# Patient Record
Sex: FEMALE | Race: WHITE | NOT HISPANIC OR LATINO | Employment: FULL TIME | URBAN - METROPOLITAN AREA
[De-identification: names, ages, dates, MRNs, and addresses within clinical notes are randomized per-mention and may not be internally consistent; named-entity substitution may affect disease eponyms.]

---

## 2017-02-17 ENCOUNTER — OFFICE VISIT (OUTPATIENT)
Dept: FAMILY MEDICINE | Facility: CLINIC | Age: 57
End: 2017-02-17
Payer: COMMERCIAL

## 2017-02-17 ENCOUNTER — HOSPITAL ENCOUNTER (OUTPATIENT)
Dept: MAMMOGRAPHY | Facility: CLINIC | Age: 57
Discharge: HOME | End: 2017-02-17
Attending: FAMILY MEDICINE | Admitting: FAMILY MEDICINE
Payer: COMMERCIAL

## 2017-02-17 VITALS
SYSTOLIC BLOOD PRESSURE: 112 MMHG | RESPIRATION RATE: 18 BRPM | HEART RATE: 104 BPM | OXYGEN SATURATION: 98 % | BODY MASS INDEX: 24.43 KG/M2 | TEMPERATURE: 98 F | WEIGHT: 152 LBS | DIASTOLIC BLOOD PRESSURE: 64 MMHG | HEIGHT: 66 IN

## 2017-02-17 DIAGNOSIS — Z11.59 NEED FOR HEPATITIS C SCREENING TEST: ICD-10-CM

## 2017-02-17 DIAGNOSIS — Z12.11 SPECIAL SCREENING FOR MALIGNANT NEOPLASMS, COLON: ICD-10-CM

## 2017-02-17 DIAGNOSIS — Z12.31 VISIT FOR SCREENING MAMMOGRAM: ICD-10-CM

## 2017-02-17 DIAGNOSIS — Z00.00 ENCOUNTER FOR ROUTINE ADULT HEALTH EXAMINATION WITHOUT ABNORMAL FINDINGS: Primary | ICD-10-CM

## 2017-02-17 LAB
CHOLEST SERPL-MCNC: 259 MG/DL
GLUCOSE SERPL-MCNC: 92 MG/DL (ref 70–99)
HCV AB SERPL QL IA: NORMAL
HDLC SERPL-MCNC: 65 MG/DL
LDLC SERPL CALC-MCNC: 151 MG/DL
NONHDLC SERPL-MCNC: 194 MG/DL
TRIGL SERPL-MCNC: 213 MG/DL

## 2017-02-17 PROCEDURE — 80061 LIPID PANEL: CPT | Performed by: FAMILY MEDICINE

## 2017-02-17 PROCEDURE — 82947 ASSAY GLUCOSE BLOOD QUANT: CPT | Performed by: FAMILY MEDICINE

## 2017-02-17 PROCEDURE — 36415 COLL VENOUS BLD VENIPUNCTURE: CPT | Performed by: FAMILY MEDICINE

## 2017-02-17 PROCEDURE — 99396 PREV VISIT EST AGE 40-64: CPT | Performed by: FAMILY MEDICINE

## 2017-02-17 PROCEDURE — 86803 HEPATITIS C AB TEST: CPT | Performed by: FAMILY MEDICINE

## 2017-02-17 PROCEDURE — G0202 SCR MAMMO BI INCL CAD: HCPCS

## 2017-02-17 ASSESSMENT — PAIN SCALES - GENERAL: PAINLEVEL: NO PAIN (0)

## 2017-02-17 NOTE — NURSING NOTE
"Chief Complaint   Patient presents with     Physical       Initial /64  Pulse 104  Temp 98  F (36.7  C)  Resp 18  Ht 5' 5.7\" (1.669 m)  Wt 152 lb (68.9 kg)  LMP 12/04/2016  SpO2 98%  Breastfeeding? No  BMI 24.76 kg/m2 Estimated body mass index is 24.76 kg/(m^2) as calculated from the following:    Height as of this encounter: 5' 5.7\" (1.669 m).    Weight as of this encounter: 152 lb (68.9 kg).  Medication Reconciliation: complete    "

## 2017-02-17 NOTE — MR AVS SNAPSHOT
After Visit Summary   2/17/2017    Diann Altman    MRN: [id number]           Patient Information     Date Of Birth          1960        Visit Information        Provider Department      2/17/2017 7:45 AM Adonay Robison MD [facility]        Today's Diagnoses     Encounter for routine adult health examination without abnormal findings    -  1    Need for hepatitis C screening test        Special screening for malignant neoplasms, colon          Care Instructions      Preventive Health Recommendations  Female Ages 50 - 64    Yearly exam: See your health care provider every year in order to  o Review health changes.   o Discuss preventive care.    o Review your medicines if your doctor has prescribed any.      Get a Pap test every three years (unless you have an abnormal result and your provider advises testing more often).    If you get Pap tests with HPV test, you only need to test every 5 years, unless you have an abnormal result.     You do not need a Pap test if your uterus was removed (hysterectomy) and you have not had cancer.    You should be tested each year for STDs (sexually transmitted diseases) if you're at risk.     Have a mammogram every 1 to 2 years.    Have a colonoscopy at age 50, or have a yearly FIT test (stool test). These exams screen for colon cancer.      Have a cholesterol test every 5 years, or more often if advised.    Have a diabetes test (fasting glucose) every three years. If you are at risk for diabetes, you should have this test more often.     If you are at risk for osteoporosis (brittle bone disease), think about having a bone density scan (DEXA).    Shots: Get a flu shot each year. Get a tetanus shot every 10 years.    Nutrition:     Eat at least 5 servings of fruits and vegetables each day.    Eat whole-grain bread, whole-wheat pasta and brown rice instead of white grains and rice.    Talk to your provider about Calcium and Vitamin D.  "    Lifestyle    Exercise at least 150 minutes a week (30 minutes a day, 5 days a week). This will help you control your weight and prevent disease.    Limit alcohol to one drink per day.    No smoking.     Wear sunscreen to prevent skin cancer.     See your dentist every six months for an exam and cleaning.    See your eye doctor every 1 to 2 years.          Follow-ups after your visit        Who to contact     If you have questions or need follow up information about today's clinic visit or your schedule please contact [facility] directly at [phone].  Normal or non-critical lab and imaging results will be communicated to you by [facility], letter or phone within 4 business days after the clinic has received the results. If you do not hear from us within 7 days, please contact the clinic through [employer] or phone. If you have a critical or abnormal lab result, we will notify you by phone as soon as possible.  Submit refill requests through [employer] or call your pharmacy and they will forward the refill request to us. Please allow 3 business days for your refill to be completed.          Additional Information About Your Visit        [facility] Information     [employer] gives you secure access to your electronic health record. If you see a primary care provider, you can also send messages to your care team and make appointments. If you have questions, please call your primary care clinic.  If you do not have a primary care provider, please call [phone] and they will assist you.        Care EveryWhere ID     This is your Care EveryWhere ID. This could be used by other organizations to access your [facility] medical records  [id number]        Your Vitals Were     Pulse Temperature Respirations Height Last Period Pulse Oximetry    104 98  F (36.7  C) 18 5' 5.7\" (1.669 m) 12/04/2016 98%    Breastfeeding? BMI (Body Mass Index)                No 24.76 kg/m2           Blood Pressure from Last 3 Encounters: "   02/17/17 112/64   09/08/16 122/66   02/19/16 100/68    Weight from Last 3 Encounters:   02/17/17 152 lb (68.9 kg)   09/08/16 147 lb (66.7 kg)   02/19/16 142 lb (64.4 kg)              We Performed the Following     Glucose     Hepatitis C Screen Reflex to HCV RNA Quant and Genotype     Lipid panel reflex to direct LDL        Primary Care Provider Office Phone # Fax #    Adonay Robison [phone]       [facility] [place] [address]   [place] [address]        Thank you!     Thank you for choosing [facility]  for your care. Our goal is always to provide you with excellent care. Hearing back from our patients is one way we can continue to improve our services. Please take a few minutes to complete the written survey that you may receive in the mail after your visit with us. Thank you!             Your Updated Medication List - Protect others around you: Learn how to safely use, store and throw away your medicines at www.disposemymeds.org.      Notice  As of 2/17/2017 11:59 PM    You have not been prescribed any medications.

## 2017-02-17 NOTE — PROGRESS NOTES
SUBJECTIVE:     CC: Diann Altman is an 56 year old woman who presents for preventive health visit.     Healthy Habits:    Do you get at least three servings of calcium containing foods daily (dairy, green leafy vegetables, etc.)? yes    Amount of exercise or daily activities, outside of work: 6 days per week.     Problems taking medications regularly No    Medication side effects: No    Have you had an eye exam in the past two years? Yes, wears glasses, goes in April     Do you see a dentist twice per year? yes  Do you have sleep apnea, excessive snoring or daytime drowsiness?no        Today's PHQ-2 Score: 0  PHQ-2 ( 1999 Pf[employer]) 2/14/2017 2/15/2016   Little interest or pleasure in doing things Not at all Not at all   Feeling down, depressed or hopeless Not at all Not at all   PHQ-2 Score 0 0       Abuse: Current or Past(Physical, Sexual or Emotional)- No  Do you feel safe in your environment - Yes    Social History   Substance Use Topics     Smoking status: Former Smoker     Quit date: 1/1/1987     Smokeless tobacco: Never Used     Alcohol use Yes      Comment: 1-2 uses/year     The patient does not drink >3 drinks per day nor >7 drinks per week.    Recent Labs   Lab Test  02/12/16   0916  01/14/15   0909   12/21/12   0934   CHOL  237*  217*   < >  226*   HDL  82  68   < >  76   LDL  133*  117   < >  126   TRIG  111  158*   < >  121   CHOLHDLRATIO   --   3.2   --   3.0   NHDL  155*   --    --    --     < > = values in this interval not displayed.       Reviewed orders with patient.  Reviewed health maintenance and updated orders accordingly - Yes    Mammo Decision Support:  Today   Patient over age 50, mutual decision to screen reflected in health maintenance.    Pertinent mammograms are reviewed under the imaging tab.  History of abnormal Pap smear: NO - age 30- 65 PAP every 3 years recommended  All Histories reviewed and updated in Epic.    Past Medical History   Diagnosis Date     NO ACTIVE PROBLEMS        Past Surgical History   Procedure Laterality Date     No history of surgery       Eye surgery  as child     bilat.     Obstetric History       T2      TAB0   SAB0   E0   M0   L2       # Outcome Date GA Lbr Luis Carlos/2nd Weight Sex Delivery Anes PTL Lv   2 Term            1 Term                   ROS:  C: NEGATIVE for fever, chills, change in weight  I: NEGATIVE for worrisome rashes, moles or lesions  E: NEGATIVE for vision changes or irritation  ENT: NEGATIVE for ear, mouth and throat problems  R: NEGATIVE for significant cough or SOB  B: NEGATIVE for masses, tenderness or discharge  CV: NEGATIVE for chest pain, palpitations or peripheral edema  GI: NEGATIVE for nausea, abdominal pain, heartburn, or change in bowel habits  : NEGATIVE for unusual urinary or vaginal symptoms. No vaginal bleeding.  M: NEGATIVE for significant arthralgias or myalgia  N: NEGATIVE for weakness, dizziness or paresthesias  P: NEGATIVE for changes in mood or affect     Problem list, Medication list, Allergies, and Medical/Social/Surgical histories reviewed in [facility] and updated as appropriate.  Labs reviewed in EPIC  BP Readings from Last 3 Encounters:   17 112/64   16 122/66   16 100/68    Wt Readings from Last 3 Encounters:   17 152 lb (68.9 kg)   16 147 lb (66.7 kg)   16 142 lb (64.4 kg)                  Patient Active Problem List   Diagnosis     HYPERLIPIDEMIA LDL GOAL <130     Counseling regarding advanced directives     Past Surgical History   Procedure Laterality Date     No history of surgery       Eye surgery  as child     bilat.       Social History   Substance Use Topics     Smoking status: Former Smoker     Quit date: 1987     Smokeless tobacco: Never Used     Alcohol use Yes      Comment: 1-2 uses/year     Family History   Problem Relation Age of Onset     HEART DISEASE Father      Lipids Father      DIABETES Maternal Grandmother      Eye Disorder Maternal Grandmother       "cataract     HEART DISEASE Maternal Grandmother      Arthritis Maternal Grandmother      Hypertension Maternal Grandmother      Hypertension Sister      Lipids Sister      Gynecology Sister      pelvic inflam disease     Allergies Mother      Respiratory Mother      emphysema     HEART DISEASE Mother      COPD     Arthritis Paternal Grandmother      Eye Disorder Paternal Grandmother      cataract     GASTROINTESTINAL DISEASE Paternal Grandmother      diverticulitis     Lipids Paternal Grandfather      DIABETES Paternal Grandfather      HEART DISEASE Maternal Grandfather      Cardiovascular Maternal Grandfather      MI         No current outpatient prescriptions on file.     Allergies   Allergen Reactions     Clindamycin Hives     Recent Labs   Lab Test  02/17/17   0902  02/12/16   0916  01/14/15   0909 01/09/14 12/21/12   0934   07/28/10   0940   LDL  151*  133*  117  69  126   < >   --    HDL  65  82  68  64  76   < >   --    TRIG  213*  111  158*  135  121   < >   --    ALT   --   21   --   12  25   < >   --    CR   --   0.70   --   0.72  0.66   --    --    GFRESTIMATED   --   87   --   97  >90   --    --    GFRESTBLACK   --   >90   GFR Calc     --   111  >90   --    --    POTASSIUM   --   3.8   --   4.2   --    --    --    TSH   --    --    --   1.790   --    --   1.78    < > = values in this interval not displayed.      OBJECTIVE:     /64  Pulse 104  Temp 98  F (36.7  C)  Resp 18  Ht 5' 5.7\" (1.669 m)  Wt 152 lb (68.9 kg)  LMP 12/04/2016  SpO2 98%  Breastfeeding? No  BMI 24.76 kg/m2  EXAM:  GENERAL APPEARANCE: healthy, alert and no distress  EYES: Eyes grossly normal to inspection, PERRL and conjunctivae and sclerae normal  HENT: ear canals and TM's normal, nose and mouth without ulcers or lesions, oropharynx clear and oral mucous membranes moist  NECK: no adenopathy, no asymmetry, masses, or scars and thyroid normal to palpation  RESP: lungs clear to auscultation - no rales, rhonchi or " "wheezes  BREAST: Current recommendations against routine clinical breast exam and the risks/benefits of doing such exams were discussed with patient.  She declined exam today.  CV: regular rate and rhythm, normal S1 S2, no S3 or S4, no murmur, click or rub, no peripheral edema and peripheral pulses strong  ABDOMEN: soft, nontender, no hepatosplenomegaly, no masses and bowel sounds normal  :  Current recommendations against routine bimanual pelvic exams in asymptomatic patients and the risks/benefits of doing such exams were discussed with patient.  She declined exam today.   MS: no musculoskeletal defects are noted and gait is age appropriate without ataxia  SKIN: no suspicious lesions or rashes  NEURO: Normal strength and tone, sensory exam grossly normal, mentation intact and speech normal  PSYCH: mentation appears normal and affect normal/bright    ASSESSMENT/PLAN:         ICD-10-CM    1. Encounter for routine adult health examination without abnormal findings Z00.00 Lipid panel reflex to direct LDL     Glucose   2. Need for hepatitis C screening test Z11.59 Hepatitis C Screen Reflex to HCV RNA Quant and Genotype   3. Special screening for malignant neoplasms, colon Z12.11 Fecal colorectal cancer screen (FIT)     PLAN:  1. Counseled on Flu vaccine, she declines  - offered Hepatitis A and B vaccines, she declines       COUNSELING:   Reviewed preventive health counseling, as reflected in patient instructions         reports that she quit smoking about 30 years ago. She has never used smokeless tobacco.    Estimated body mass index is 24.76 kg/(m^2) as calculated from the following:    Height as of this encounter: 5' 5.7\" (1.669 m).    Weight as of this encounter: 152 lb (68.9 kg).       Counseling Resources:  ATP IV Guidelines  Pooled Cohorts Equation Calculator  Breast Cancer Risk Calculator  FRAX Risk Assessment  ICSI Preventive Guidelines  Dietary Guidelines for Americans, 2010  USDA's MyPlate  ASA " Prophylaxis  Lung CA Screening      This document serves as a record of the services and decisions personally performed and made by Adonay Robison MD.It was created on his behalf by Luz Gonzalez,  trained medical scribes. The creation of this document is based the provider's statements to the medical scribe. February 17, 2017 8:25 AM    The information in this document, created by the medical scribe for me, accurately reflects the services I personally performed and the decisions made by me. I have reviewed and approved this document for accuracy.     Adonay Robison MD   [facility]          Answers for HPI/ROS submitted by the patient on 2/14/2017   Annual Exam:  Getting at least 3 servings of Calcium per day:: Yes  Bi-annual eye exam:: Yes  Dental care twice a year:: Yes  Sleep apnea or symptoms of sleep apnea:: None  Diet:: Regular (no restrictions)  Frequency of exercise:: 4-5 days/week  Taking medications regularly:: Not Applicable  Medication side effects:: Not applicable  Additional concerns today:: No  PHQ-2 Depressed: Not at all, Not at all  PHQ-2 Score: 0  Duration of exercise:: 15-30 minutes

## 2017-03-30 ENCOUNTER — TELEPHONE (OUTPATIENT)
Dept: FAMILY MEDICINE | Facility: CLINIC | Age: 57
End: 2017-03-30

## 2017-03-30 NOTE — LETTER
[address] [address]  [phone]        March 30, 2017    Diann Altman  [address] [address] [place] [id number]          Dear Diann,     This is a reminder that you are due to come into Lab to pickup a FIT test to due your stool sample.  The order is in.  This is a test for your colon cancer.  Please do this as soon as you can.      Sincerely,        Adonay Robison M.D.

## 2017-03-30 NOTE — TELEPHONE ENCOUNTER
Panel Management Review      Patient has the following on her problem list: None      Composite cancer screening  Chart review shows that this patient is due/due soon for the following Fecal Colorectal (FIT)  Summary:    Patient is due/failing the following:   FIT test     Action needed:   Pickup a fit test    Type of outreach:    Sent letter.    Questions for provider review:    None                                                                                                                                           Chart routed to  .

## 2018-01-24 DIAGNOSIS — Z12.11 SPECIAL SCREENING FOR MALIGNANT NEOPLASMS, COLON: ICD-10-CM

## 2018-01-24 LAB — HEMOCCULT STL QL IA: NEGATIVE

## 2018-01-24 PROCEDURE — 82274 ASSAY TEST FOR BLOOD FECAL: CPT | Performed by: FAMILY MEDICINE

## 2018-01-25 NOTE — PROGRESS NOTES
Diann,  Your results are normal.  Please let me know if you have any questions.    Sincerely,  Dr. Robison

## 2019-05-03 ENCOUNTER — HEALTH MAINTENANCE LETTER (OUTPATIENT)
Age: 59
End: 2019-05-03

## 2019-07-05 ENCOUNTER — OFFICE VISIT (OUTPATIENT)
Dept: FAMILY MEDICINE | Facility: CLINIC | Age: 59
End: 2019-07-05
Payer: COMMERCIAL

## 2019-07-05 ENCOUNTER — HOSPITAL ENCOUNTER (OUTPATIENT)
Dept: MAMMOGRAPHY | Facility: CLINIC | Age: 59
Discharge: HOME | End: 2019-07-05
Attending: FAMILY MEDICINE | Admitting: FAMILY MEDICINE
Payer: COMMERCIAL

## 2019-07-05 VITALS
OXYGEN SATURATION: 99 % | TEMPERATURE: 97.3 F | HEART RATE: 84 BPM | RESPIRATION RATE: 18 BRPM | DIASTOLIC BLOOD PRESSURE: 60 MMHG | HEIGHT: 66 IN | SYSTOLIC BLOOD PRESSURE: 100 MMHG | BODY MASS INDEX: 24.43 KG/M2 | WEIGHT: 152 LBS
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
## 2019-07-05 DIAGNOSIS — Z12.31 VISIT FOR SCREENING MAMMOGRAM: ICD-10-CM

## 2019-07-05 DIAGNOSIS — Z12.11 COLON CANCER SCREENING: ICD-10-CM

## 2019-07-05 DIAGNOSIS — E78.5 HYPERLIPIDEMIA LDL GOAL <130: ICD-10-CM

## 2019-07-05 DIAGNOSIS — Z12.39 BREAST CANCER SCREENING: ICD-10-CM

## 2019-07-05 DIAGNOSIS — S30.861A TICK BITE OF ABDOMEN, INITIAL ENCOUNTER: ICD-10-CM

## 2019-07-05 DIAGNOSIS — Z00.00 ROUTINE GENERAL MEDICAL EXAMINATION AT A HEALTH CARE FACILITY: Primary | ICD-10-CM

## 2019-07-05 LAB
ALBUMIN SERPL-MCNC: 4.2 G/DL (ref 3.4–5)
ALP SERPL-CCNC: 77 U/L (ref 40–150)
ALT SERPL W P-5'-P-CCNC: 26 U/L (ref 0–50)
ANION GAP SERPL CALCULATED.3IONS-SCNC: 8 MMOL/L (ref 3–14)
AST SERPL W P-5'-P-CCNC: 19 U/L (ref 0–45)
BILIRUB SERPL-MCNC: 0.5 MG/DL (ref 0.2–1.3)
BUN SERPL-MCNC: 12 MG/DL (ref 7–30)
CALCIUM SERPL-MCNC: 9.4 MG/DL (ref 8.5–10.1)
CHLORIDE SERPL-SCNC: 107 MMOL/L (ref 94–109)
CHOLEST SERPL-MCNC: 219 MG/DL
CO2 SERPL-SCNC: 28 MMOL/L (ref 20–32)
CREAT SERPL-MCNC: 0.68 MG/DL (ref 0.52–1.04)
GFR SERPL CREATININE-BSD FRML MDRD: >90 ML/MIN/{1.73_M2}
GLUCOSE SERPL-MCNC: 88 MG/DL (ref 70–99)
HDLC SERPL-MCNC: 71 MG/DL
LDLC SERPL CALC-MCNC: 123 MG/DL
NONHDLC SERPL-MCNC: 148 MG/DL
POTASSIUM SERPL-SCNC: 3.8 MMOL/L (ref 3.4–5.3)
PROT SERPL-MCNC: 7.4 G/DL (ref 6.8–8.8)
SODIUM SERPL-SCNC: 143 MMOL/L (ref 133–144)
TRIGL SERPL-MCNC: 127 MG/DL

## 2019-07-05 PROCEDURE — 87624 HPV HI-RISK TYP POOLED RSLT: CPT | Performed by: FAMILY MEDICINE

## 2019-07-05 PROCEDURE — G0145 SCR C/V CYTO,THINLAYER,RESCR: HCPCS | Performed by: FAMILY MEDICINE

## 2019-07-05 PROCEDURE — 77063 BREAST TOMOSYNTHESIS BI: CPT

## 2019-07-05 PROCEDURE — 36415 COLL VENOUS BLD VENIPUNCTURE: CPT | Performed by: FAMILY MEDICINE

## 2019-07-05 PROCEDURE — 80053 COMPREHEN METABOLIC PANEL: CPT | Performed by: FAMILY MEDICINE

## 2019-07-05 PROCEDURE — 99396 PREV VISIT EST AGE 40-64: CPT | Performed by: FAMILY MEDICINE

## 2019-07-05 PROCEDURE — 80061 LIPID PANEL: CPT | Performed by: FAMILY MEDICINE

## 2019-07-05 ASSESSMENT — MIFFLIN-ST. JEOR: SCORE: 1283.28

## 2019-07-05 ASSESSMENT — ENCOUNTER SYMPTOMS
SORE THROAT: 0
NAUSEA: 0
EYE PAIN: 0
SHORTNESS OF BREATH: 0
DYSURIA: 0
PALPITATIONS: 0
CHILLS: 0
HEADACHES: 0
PARESTHESIAS: 0
NERVOUS/ANXIOUS: 0
HEMATURIA: 0
FEVER: 0
JOINT SWELLING: 0
FREQUENCY: 0
ABDOMINAL PAIN: 0
ARTHRALGIAS: 0
MYALGIAS: 0
DIARRHEA: 0
COUGH: 0
WEAKNESS: 0
BREAST MASS: 0
CONSTIPATION: 0
DIZZINESS: 0
HEMATOCHEZIA: 0
HEARTBURN: 0

## 2019-07-05 ASSESSMENT — PAIN SCALES - GENERAL: PAINLEVEL: NO PAIN (0)

## 2019-07-05 NOTE — PROGRESS NOTES
"   SUBJECTIVE:   CC: Diann Altman is an 58 year old woman who presents for preventive health visit.     Healthy Habits:     Getting at least 3 servings of Calcium per day:  Yes    Bi-annual eye exam:  Yes    Dental care twice a year:  Yes    Sleep apnea or symptoms of sleep apnea:  None    Diet:  Regular (no restrictions)    Frequency of exercise:  4-5 days/week    Duration of exercise:  15-30 minutes    Taking medications regularly:  Yes    Medication side effects:  None    PHQ-2 Total Score: 0    Additional concerns today:  No      Diann is here today for her general physical.  She has no concerns today except that she gets poor of a tick on her upper abdomen underneath her breast treated 2 days ago.  It was a very small tick, \"look like a deer tick\".  Was not engorged when it was removed.  She did not think she has it too long.  No rash.  No muscle ache or joint pain.  She is not really concerned about it; just to mention it because she is here.    Otherwise, she is doing well. Her last physical exam was couple years ago and it was normal.  No major medical care or procedure done since the last physical. No headache or dizziness.  No acute visual change. No URI symptoms include running nose, nasal congestion, coughing, fever or chill. No CP/SOB. No N/V/D/C. No problem with urination.  No abdominal pain.  She is post-menopausal - no history of abnormal pap or STD.  Last pap was 3 years ago and it was normal.  Denied of STD risks.  No leg swelling or pain. Does exercises 4-5 times a week. Social drinker but no drug use.  No smoking. No problem with sleeping.  Feels safe at home and at work.  She lives with her .  No weight change and denies of being depressed. No other concern today      Today's PHQ-2 Score:   PHQ-2 ( 1999 Pfizer) 7/5/2019   Q1: Little interest or pleasure in doing things 0   Q2: Feeling down, depressed or hopeless 0   PHQ-2 Score 0   Q1: Little interest or pleasure in doing things Not at all "   Q2: Feeling down, depressed or hopeless Not at all   PHQ-2 Score 0       Abuse: Current or Past(Physical, Sexual or Emotional)- No  Do you feel safe in your environment? Yes    Social History     Tobacco Use     Smoking status: Former Smoker     Last attempt to quit: 1987     Years since quittin.5     Smokeless tobacco: Never Used   Substance Use Topics     Alcohol use: Yes     Comment: 1-2 uses/year         Alcohol Use 2019   Prescreen: >3 drinks/day or >7 drinks/week? No   Prescreen: >3 drinks/day or >7 drinks/week? -       Reviewed orders with patient.  Reviewed health maintenance and updated orders accordingly - Yes  Patient Active Problem List   Diagnosis     HYPERLIPIDEMIA LDL GOAL <130     Counseling regarding advanced directives     Past Surgical History:   Procedure Laterality Date     EYE SURGERY  as child    bilat.       Social History     Tobacco Use     Smoking status: Former Smoker     Last attempt to quit: 1987     Years since quittin.5     Smokeless tobacco: Never Used   Substance Use Topics     Alcohol use: Yes     Comment: 1-2 uses/year     Family History   Problem Relation Age of Onset     Heart Disease Father      Lipids Father      Diabetes Maternal Grandmother      Eye Disorder Maternal Grandmother         cataract     Heart Disease Maternal Grandmother      Arthritis Maternal Grandmother      Hypertension Maternal Grandmother      Hypertension Sister      Lipids Sister      Gynecology Sister         pelvic inflam disease     Allergies Mother      Respiratory Mother         emphysema     Heart Disease Mother         COPD     Arthritis Paternal Grandmother      Eye Disorder Paternal Grandmother         cataract     Gastrointestinal Disease Paternal Grandmother         diverticulitis     Lipids Paternal Grandfather      Diabetes Paternal Grandfather      Heart Disease Maternal Grandfather      Cardiovascular Maternal Grandfather         MI     No Known Problems Son      No  Known Problems Son          No current outpatient medications on file.     Allergies   Allergen Reactions     Clindamycin Hives       Mammogram Screening: Patient over age 50, mutual decision to screen reflected in health maintenance.    Pertinent mammograms are reviewed under the imaging tab.  History of abnormal Pap smear: NO - age 30-65 PAP every 5 years with negative HPV co-testing recommended  PAP / HPV 2012   PAP NIL NIL NIL     Reviewed and updated as needed this visit by clinical staff  Tobacco  Allergies  Meds  Med Hx  Surg Hx  Fam Hx  Soc Hx        Reviewed and updated as needed this visit by Provider        Past Medical History:   Diagnosis Date     NO ACTIVE PROBLEMS       Past Surgical History:   Procedure Laterality Date     EYE SURGERY  as child    bilat.     OB History    Para Term  AB Living   2 2 2 0 0 2   SAB TAB Ectopic Multiple Live Births   0 0 0 0 0      # Outcome Date GA Lbr Luis Carlos/2nd Weight Sex Delivery Anes PTL Lv   2 Term            1 Term                Review of Systems   Constitutional: Negative for chills and fever.   HENT: Negative for congestion, ear pain, hearing loss and sore throat.    Eyes: Negative for pain and visual disturbance.   Respiratory: Negative for cough and shortness of breath.    Cardiovascular: Negative for chest pain, palpitations and peripheral edema.   Gastrointestinal: Negative for abdominal pain, constipation, diarrhea, heartburn, hematochezia and nausea.   Breasts:  Negative for tenderness, breast mass and discharge.   Genitourinary: Negative for dysuria, frequency, genital sores, hematuria, pelvic pain, urgency, vaginal bleeding and vaginal discharge.   Musculoskeletal: Negative for arthralgias, joint swelling and myalgias.   Skin: Negative for rash.   Neurological: Negative for dizziness, weakness, headaches and paresthesias.   Psychiatric/Behavioral: Negative for mood changes. The patient is not nervous/anxious.   "         OBJECTIVE:   /60   Pulse 84   Temp 97.3  F (36.3  C) (Temporal)   Resp 18   Ht 1.664 m (5' 5.5\")   Wt 68.9 kg (152 lb)   LMP 12/04/2016   SpO2 99%   BMI 24.91 kg/m    Physical Exam   GENERAL: healthy, alert and no distress.  Speaking in full sentences.  EYES: Eyes grossly normal to inspection, PERRL and conjunctivae and sclerae normal.  No nystagmus.  All 4 visual fields intact.  HENT: ear canals and TM's normal.  Nares are non-congested. Oropharynx is pink and moist. No tender with palpation to the sinuses.   NECK: no adenopathy, supple, no lymphadenopathy or thyromegaly.  No tender with palpation to the cervical spine or its paraspinous muscle.  RESP: lungs clear to auscultation - no rales, rhonchi or wheezes.  Good respiratory effort throughout.  BREAST: Offered and recommended but she declined.  She has no concerns.  CV: regular rate and rhythm, no murmur  ABDOMEN: soft, nontender, nondistended, no palpable masses organomegaly with normal culture.  No CVA tenderness.   (female): normal female external genitalia, normal urethral meatus, vaginal mucosa, normal cervix/adnexa/uterus without masses or discharge. Pending for pap and HPV.  MS: no gross musculoskeletal defects noted, no edema. All joints are in the normal range of motion and 4 extremities were equally in strength. Hips, knees, ankles, shoulders, elbows, wrists exams were normal. Fine motor skills of the fingers are intact. Back is straight, no tender with palpation to the spine.  SKIN: no suspicious lesions or rashes  NEURO: Normal strength and tone, mentation intact and speech normal.  Cranial nerve II through XII intact.  DTRs +2 throughout.  No focal neurological deficit.  PSYCH: mentation appears normal, affect normal/bright.  Thoughts intact, suicidal/homicidal ideation.  No hallucination.      Diagnostic Test Results:  Labs reviewed in Epic  No results found for this or any previous visit (from the past 24 " hour(s)).    ASSESSMENT/PLAN:   1. Routine general medical examination at a health care facility  Overall Diann is healthy and doing well.  UTD for immunization - discussed about shingles vaccination.  Topics appropriate for her age discussed include safety issue and healthy lifestyle modification as well as substance abuse/STD/depression prevention.  Fall prevention also discussed.  Recommended daily exercise for at least 30 minutes.  Emphasized on healthy diet with adequate fluid intake and resting. Feels safe at home and at work.  Follow in 1 year, earlier as needed.  Labs as ordered below.  UTD for mammogram - schedule one for this morning.  All of her questions were answered.    - Lipid panel reflex to direct LDL Fasting  - Comprehensive metabolic panel (BMP + Alb, Alk Phos, ALT, AST, Total. Bili, TP)  - Pap imaged thin layer screen with HPV - recommended age 30 - 65 years (select HPV order below)    2. Hyperlipidemia LDL goal <130  Not on any medication.  Last cholesterol check was 2 years ago.  Check the cholesterol level today and will go from there.  Emphasized on healthy lifestyle modification as above.    3. Breast cancer screening  Mammogram is scheduled for this morning.  Offered breast exam but she declined.  Recommend mammogram annually.    4. Colon cancer screening  Discussed with her about options for colon cancer screening which include colonoscopy, Cologuard or FIT test.  Pros and cons of each option discussed.  She preferred FIT test.  She understands that positive FIT test will require further evaluation with colonoscopy.  She also informed that FIT is to be done annually.    - Fecal colorectal cancer screen (FIT); Future    5.  Tick bite  The tick was removed 2 days ago.  No rash.   Does not meet the criteria for prophylactic treatment.  Discussed about screening for Lyme disease which should be done in 3-4 weeks.  She feels the tick was not in her that long enough.  Tick was not engorged.  She  "will forego the screening test for now and will let me know if she change her mind.  She was informed that deer tick bit can cause Lyme dz even without the typical symptoms such as rash, joint/muscle pain or fever.    COUNSELING:  Reviewed preventive health counseling, as reflected in patient instructions       Regular exercise       Healthy diet/nutrition       Vision screening       Hearing screening       Immunizations    Recommended shingle vaccination             Aspirin Prophylaxsis       Alcohol Use       Osteoporosis Prevention/Bone Health       Safe sex practices/STD prevention       Colon cancer screening       (Marilyn)menopause management    Estimated body mass index is 24.91 kg/m  as calculated from the following:    Height as of this encounter: 1.664 m (5' 5.5\").    Weight as of this encounter: 68.9 kg (152 lb).         reports that she quit smoking about 32 years ago. She has never used smokeless tobacco.      Counseling Resources:  ATP IV Guidelines  Pooled Cohorts Equation Calculator  Breast Cancer Risk Calculator  FRAX Risk Assessment  ICSI Preventive Guidelines  Dietary Guidelines for Americans, 2010  USDA's MyPlate  ASA Prophylaxis  Lung CA Screening    Nataly Henriquez Mai, MD  [facility]  "

## 2019-07-09 LAB
COPATH REPORT: NORMAL
PAP: NORMAL

## 2019-07-10 LAB
FINAL DIAGNOSIS: NORMAL
HPV HR 12 DNA CVX QL NAA+PROBE: NEGATIVE
HPV16 DNA SPEC QL NAA+PROBE: NEGATIVE
HPV18 DNA SPEC QL NAA+PROBE: NEGATIVE
SPECIMEN DESCRIPTION: NORMAL
SPECIMEN SOURCE CVX/VAG CYTO: NORMAL

## 2019-08-23 ENCOUNTER — TELEPHONE (OUTPATIENT)
Dept: FAMILY MEDICINE | Facility: CLINIC | Age: 59
End: 2019-08-23

## 2019-08-23 NOTE — TELEPHONE ENCOUNTER
Panel Management Review      Patient has the following on her problem list: None      Composite cancer screening  Chart review shows that this patient is due/due soon for the following Fecal Colorectal (FIT)  Summary:    Patient is due/failing the following:   Shingles vaccine, FIT test    Action needed:   Complete FIT testing    Type of outreach:    Phone, spoke to patient.  she said she has it at home just hasn't done it yet.   Told her to let us know if she has any questions about it.    Questions for provider review:    None                                                                                                                                    Woo Brown CMA       Chart routed to Closed .

## 2019-11-03 ENCOUNTER — HEALTH MAINTENANCE LETTER (OUTPATIENT)
Age: 59
End: 2019-11-03

## 2020-11-16 ENCOUNTER — HEALTH MAINTENANCE LETTER (OUTPATIENT)
Age: 60
End: 2020-11-16

## 2021-04-10 ASSESSMENT — ENCOUNTER SYMPTOMS
COUGH: 0
FREQUENCY: 0
SHORTNESS OF BREATH: 0
WEAKNESS: 0
SORE THROAT: 0
FEVER: 0
PARESTHESIAS: 0
HEARTBURN: 0
DYSURIA: 0
MYALGIAS: 0
NAUSEA: 0
EYE PAIN: 0
HEMATOCHEZIA: 0
CHILLS: 0
HEMATURIA: 0
BREAST MASS: 0
CONSTIPATION: 0
ABDOMINAL PAIN: 0
DIARRHEA: 0
JOINT SWELLING: 0
ARTHRALGIAS: 0
DIZZINESS: 0
HEADACHES: 0
NERVOUS/ANXIOUS: 0
PALPITATIONS: 0

## 2021-04-12 ASSESSMENT — ENCOUNTER SYMPTOMS
HEMATOCHEZIA: 0
BREAST MASS: 0
CHILLS: 0
WEAKNESS: 0
PARESTHESIAS: 0
PALPITATIONS: 0
CONSTIPATION: 0
MYALGIAS: 0
FREQUENCY: 0
DIARRHEA: 0
ABDOMINAL PAIN: 0
ARTHRALGIAS: 0
JOINT SWELLING: 0
NERVOUS/ANXIOUS: 0
NAUSEA: 0
SHORTNESS OF BREATH: 0
DIZZINESS: 0
DYSURIA: 0
EYE PAIN: 0
HEMATURIA: 0
COUGH: 0
FEVER: 0
HEADACHES: 0
SORE THROAT: 0
HEARTBURN: 0

## 2021-04-12 NOTE — PROGRESS NOTES
"   SUBJECTIVE:   CC: Diann Altman is an 60 year old woman who presents for preventive health visit.       Patient has been advised of split billing requirements and indicates understanding: Yes  Healthy Habits:     Getting at least 3 servings of Calcium per day:  Yes    Bi-annual eye exam:  Yes    Dental care twice a year:  Yes    Sleep apnea or symptoms of sleep apnea:  None    Diet:  Regular (no restrictions)    Frequency of exercise:  4-5 days/week    Duration of exercise:  15-30 minutes    Taking medications regularly:  Yes    Medication side effects:  Not applicable    PHQ-2 Total Score: 0    Additional concerns today:  No      Diann is here today for her general physical.  She is doing well.  No major medical care or procedure done since the last physical couple years ago. No headache or dizziness.  No acute visual change. No runny nose, nasal congestion, coughing, fever or chill. No CP/SOB. No N/V/D/C. No problem with urination.  No abdominal pain.  She is post-menopausal - no history of abnormal pap or STD.  Last pap was couple years ago and it was normal with negative HR HPV.  Denied of STD risks.  No leg swelling or pain. Exercising 2-3 times week 20-30 minutes each time.  Also takes care of horses for fun. Social drinker but no drug or tobacco. Has trouble with falling sleep due to \"overthinking and racing thoughts\".  Feels safe.  She lives .  No weight change and denies of being depressed. No other concern today    Today's PHQ-2 Score:   PHQ-2 ( 1999 Pfizer) 4/10/2021   Q1: Little interest or pleasure in doing things 0   Q2: Feeling down, depressed or hopeless 0   PHQ-2 Score 0   Q1: Little interest or pleasure in doing things Not at all   Q2: Feeling down, depressed or hopeless Not at all   PHQ-2 Score 0       Abuse: Current or Past (Physical, Sexual or Emotional) - No  Do you feel safe in your environment? Yes        Social History     Tobacco Use     Smoking status: Former Smoker     Quit date: " 1987     Years since quittin.3     Smokeless tobacco: Never Used   Substance Use Topics     Alcohol use: Yes     Comment: 1-2 uses/year     If you drink alcohol do you typically have >3 drinks per day or >7 drinks per week? No    Alcohol Use 4/10/2021   Prescreen: >3 drinks/day or >7 drinks/week? No   Prescreen: >3 drinks/day or >7 drinks/week? -   No flowsheet data found.    Reviewed orders with patient.  Reviewed health maintenance and updated orders accordingly - Yes  Patient Active Problem List   Diagnosis     HYPERLIPIDEMIA LDL GOAL <130     Counseling regarding advanced directives     Past Surgical History:   Procedure Laterality Date     EYE SURGERY  as child    bilat.       Social History     Tobacco Use     Smoking status: Former Smoker     Quit date: 1987     Years since quittin.3     Smokeless tobacco: Never Used   Substance Use Topics     Alcohol use: Yes     Comment: 1-2 uses/year     Family History   Problem Relation Age of Onset     Heart Disease Father      Lipids Father      Diabetes Maternal Grandmother      Eye Disorder Maternal Grandmother         cataract     Heart Disease Maternal Grandmother      Arthritis Maternal Grandmother      Hypertension Maternal Grandmother      Hypertension Sister      Lipids Sister      Gynecology Sister         pelvic inflam disease     Allergies Mother      Respiratory Mother         emphysema     Heart Disease Mother         COPD     Arthritis Paternal Grandmother      Eye Disorder Paternal Grandmother         cataract     Gastrointestinal Disease Paternal Grandmother         diverticulitis     Lipids Paternal Grandfather      Diabetes Paternal Grandfather      Heart Disease Maternal Grandfather      Cardiovascular Maternal Grandfather         MI     No Known Problems Son          Current Outpatient Medications   Medication Sig Dispense Refill     citalopram (CELEXA) 10 MG tablet Take 1 tablet (10 mg) by mouth daily 30 tablet 0     Allergies    Allergen Reactions     Clindamycin Hives       Breast Cancer Screening:    Breast CA Risk Assessment (FHS-7) 4/10/2021   Do you have a family history of breast, colon, or ovarian cancer? No / Unknown         Mammogram Screening: Recommended mammography every 1-2 years with patient discussion and risk factor consideration  Pertinent mammograms are reviewed under the imaging tab.    History of abnormal Pap smear: NO - age 30-65 PAP every 5 years with negative HPV co-testing recommended  PAP / HPV Latest Ref Rng & Units 7/5/2019 2/19/2016 12/21/2012   PAP - NIL NIL NIL   HPV 16 DNA NEG:Negative Negative - -   HPV 18 DNA NEG:Negative Negative - -   OTHER HR HPV NEG:Negative Negative - -     Reviewed and updated as needed this visit by clinical staff                 Reviewed and updated as needed this visit by Provider                Past Medical History:   Diagnosis Date     AC (acromioclavicular) joint arthritis      NO ACTIVE PROBLEMS       Past Surgical History:   Procedure Laterality Date     EYE SURGERY  as child    bilat.       Review of Systems   Constitutional: Negative for chills and fever.   HENT: Negative for congestion, ear pain, hearing loss and sore throat.    Eyes: Negative for pain and visual disturbance.   Respiratory: Negative for cough and shortness of breath.    Cardiovascular: Negative for chest pain, palpitations and peripheral edema.   Gastrointestinal: Negative for abdominal pain, constipation, diarrhea, heartburn, hematochezia and nausea.   Breasts:  Negative for tenderness, breast mass and discharge.   Genitourinary: Negative for dysuria, frequency, genital sores, hematuria, pelvic pain, urgency, vaginal bleeding and vaginal discharge.   Musculoskeletal: Negative for arthralgias, joint swelling and myalgias.   Skin: Negative for rash.   Neurological: Negative for dizziness, weakness, headaches and paresthesias.   Psychiatric/Behavioral: Negative for mood changes. The patient is not  "nervous/anxious.           OBJECTIVE:   /76   Pulse 90   Temp 96.9  F (36.1  C) (Temporal)   Resp 16   Ht 1.664 m (5' 5.5\")   Wt 67.9 kg (149 lb 9.6 oz)   LMP 12/04/2016   SpO2 90%   BMI 24.52 kg/m    Physical Exam   GENERAL: healthy, alert and no distress.  Speaking in full sentences.  EYES: Eyes grossly normal to inspection, PERRL and conjunctivae and sclerae normal.  No nystagmus.  All 4 visual fields intact.  HENT: ear canals and TM's normal.  Nares are non-congested. Oropharynx is pink and moist. No tender with palpation to the sinuses.   NECK: no adenopathy, supple, no lymphadenopathy or thyromegaly.  No tender with palpation to the cervical spine or its paraspinous muscle.  RESP: lungs clear to auscultation - no rales, rhonchi or wheezes.  Good respiratory effort throughout.  BREAST: Offered but declined.  She has no concern about it.  ABDOMEN: soft, nontender, nondistended, no palpable masses organomegaly with normal culture.  No CVA tenderness.   (female): Offered but declined.  She has no concern about it.    MS: no gross musculoskeletal defects noted, no edema. All joints are in the normal range of motion and 4 extremities were equally in strength. Hips, knees, ankles, shoulders, elbows, wrists exams were normal. Fine motor skills of the fingers are intact. Back is straight, no tender with palpation to the spine.  SKIN: no suspicious lesions or rashes  NEURO: Normal strength and tone, mentation intact and speech normal.  Cranial nerve II through XII intact.  DTRs +2 throughout.  No focal neurological deficit.  PSYCH: mentation appears normal, affect normal/bright.  Thoughts intact, suicidal/homicidal ideation.  No hallucination.      Diagnostic Test Results:  Labs reviewed in Epic  No results found for any visits on 04/13/21.    ASSESSMENT/PLAN:   1. Routine general medical examination at a health care facility  Overall Diann is healthy and doing well.  UTD for immunization and received the " shingle vaccination.  Up-to-date for her Covid vaccination -received the [employer] Covid vaccination on April 1.  Discussed about safety issue, healthy diet, exercising, good sleeping hygiene, advanced directive care, falling prevention, and depression prevention. Recommended to continue with daily exercise for at least 30 minutes.  Emphasized on healthy diet with adequate fluid intake and resting. Feels safe.  Follow in 1 year, earlier as needed.  Labs as ordered below.      Discussed with her about options for colon cancer screening which include colonoscopy, Cologuard or FIT test.  Pros and cons of each option discussed.  She preferred FIT test.  She understands that positive FIT test will require further evaluation.  She also informed that FIT is to be done annually.     UTD for mammogram.  She is scheduled for a follow-up mammogram in a couple weeks.  She is up-to-date for her cervical cancer screening.  All of her questions were answered.    - Lipid panel reflex to direct LDL Fasting  - Fecal colorectal cancer screen (FIT); Future  - Comprehensive metabolic panel (BMP + Alb, Alk Phos, ALT, AST, Total. Bili, TP)    2. Hyperlipidemia LDL goal <130  Her last cholesterol couple years ago was slightly elevated.  Not on medication.  Emphasized on healthy lifestyle modification as discussed above.  Recheck the cholesterol level today and will go from there.    3. Generalized anxiety disorder  She been trouble falling asleep due to racing thoughts and overthinking.  Intensely anxious.  No depression.  Will try Celexa.  Potential side effect discussed and she was informed that would take weeks for her to start feeling the medication's effect.  Discussed about sleeping aid but she declined due to fear of hanging over side effect.  Melatonin has not helped.  Good sleeping hygiene discussed.    - citalopram (CELEXA) 10 MG tablet; Take 1 tablet (10 mg) by mouth daily  Dispense: 30 tablet; Refill: 0    Patient has  "been advised of split billing requirements and indicates understanding: No  COUNSELING:  Reviewed preventive health counseling, as reflected in patient instructions       Regular exercise       Healthy diet/nutrition       Aspirin prophylaxis       Osteoporosis prevention/bone health       Colon cancer screening       Advance Care Planning    Estimated body mass index is 24.91 kg/m  as calculated from the following:    Height as of 7/5/19: 1.664 m (5' 5.5\").    Weight as of 7/5/19: 68.9 kg (152 lb).        She reports that she quit smoking about 34 years ago. She has never used smokeless tobacco.      Counseling Resources:  ATP IV Guidelines  Pooled Cohorts Equation Calculator  Breast Cancer Risk Calculator  BRCA-Related Cancer Risk Assessment: FHS-7 Tool  FRAX Risk Assessment  ICSI Preventive Guidelines  Dietary Guidelines for Americans, 2010  USDA's MyPlate  ASA Prophylaxis  Lung CA Screening    Nataly Henriquez Mai, MD  [facility]  "

## 2021-04-13 ENCOUNTER — OFFICE VISIT (OUTPATIENT)
Dept: FAMILY MEDICINE | Facility: CLINIC | Age: 61
End: 2021-04-13
Payer: COMMERCIAL

## 2021-04-13 VITALS
DIASTOLIC BLOOD PRESSURE: 76 MMHG | WEIGHT: 149.6 LBS | SYSTOLIC BLOOD PRESSURE: 124 MMHG | TEMPERATURE: 96.9 F | BODY MASS INDEX: 24.04 KG/M2 | HEIGHT: 66 IN | RESPIRATION RATE: 16 BRPM | HEART RATE: 90 BPM | OXYGEN SATURATION: 90 %

## 2021-04-13 DIAGNOSIS — Z23 NEED FOR VACCINATION: ICD-10-CM

## 2021-04-13 DIAGNOSIS — E78.5 HYPERLIPIDEMIA LDL GOAL <130: ICD-10-CM

## 2021-04-13 DIAGNOSIS — Z00.00 ROUTINE GENERAL MEDICAL EXAMINATION AT A HEALTH CARE FACILITY: Primary | ICD-10-CM

## 2021-04-13 DIAGNOSIS — F41.1 GENERALIZED ANXIETY DISORDER: ICD-10-CM

## 2021-04-13 LAB
ALBUMIN SERPL-MCNC: 4.2 G/DL (ref 3.4–5)
ALP SERPL-CCNC: 74 U/L (ref 40–150)
ALT SERPL W P-5'-P-CCNC: 25 U/L (ref 0–50)
ANION GAP SERPL CALCULATED.3IONS-SCNC: 4 MMOL/L (ref 3–14)
AST SERPL W P-5'-P-CCNC: 8 U/L (ref 0–45)
BILIRUB SERPL-MCNC: 0.4 MG/DL (ref 0.2–1.3)
BUN SERPL-MCNC: 13 MG/DL (ref 7–30)
CALCIUM SERPL-MCNC: 9.3 MG/DL (ref 8.5–10.1)
CHLORIDE SERPL-SCNC: 109 MMOL/L (ref 94–109)
CHOLEST SERPL-MCNC: 229 MG/DL
CO2 SERPL-SCNC: 29 MMOL/L (ref 20–32)
CREAT SERPL-MCNC: 0.78 MG/DL (ref 0.52–1.04)
GFR SERPL CREATININE-BSD FRML MDRD: 82 ML/MIN/{1.73_M2}
GLUCOSE SERPL-MCNC: 96 MG/DL (ref 70–99)
HDLC SERPL-MCNC: 67 MG/DL
LDLC SERPL CALC-MCNC: 131 MG/DL
NONHDLC SERPL-MCNC: 162 MG/DL
POTASSIUM SERPL-SCNC: 3.7 MMOL/L (ref 3.4–5.3)
PROT SERPL-MCNC: 7.5 G/DL (ref 6.8–8.8)
SODIUM SERPL-SCNC: 142 MMOL/L (ref 133–144)
TRIGL SERPL-MCNC: 154 MG/DL

## 2021-04-13 PROCEDURE — 80053 COMPREHEN METABOLIC PANEL: CPT | Performed by: FAMILY MEDICINE

## 2021-04-13 PROCEDURE — 90750 HZV VACC RECOMBINANT IM: CPT | Performed by: FAMILY MEDICINE

## 2021-04-13 PROCEDURE — 36415 COLL VENOUS BLD VENIPUNCTURE: CPT | Performed by: FAMILY MEDICINE

## 2021-04-13 PROCEDURE — 99396 PREV VISIT EST AGE 40-64: CPT | Mod: 25 | Performed by: FAMILY MEDICINE

## 2021-04-13 PROCEDURE — 80061 LIPID PANEL: CPT | Performed by: FAMILY MEDICINE

## 2021-04-13 PROCEDURE — 90471 IMMUNIZATION ADMIN: CPT | Performed by: FAMILY MEDICINE

## 2021-04-13 RX ORDER — CITALOPRAM HYDROBROMIDE 10 MG/1
10 TABLET ORAL DAILY
Qty: 30 TABLET | Refills: 0 | Status: SHIPPED | OUTPATIENT
Start: 2021-04-13 | End: 2021-05-10

## 2021-04-13 ASSESSMENT — MIFFLIN-ST. JEOR: SCORE: 1262.39

## 2021-04-13 ASSESSMENT — PAIN SCALES - GENERAL: PAINLEVEL: NO PAIN (0)

## 2021-04-13 NOTE — NURSING NOTE
Prior to immunization administration, verified patients identity using patient s name and date of birth. Please see Immunization Activity for additional information.     Screening Questionnaire for Adult Immunization    Are you sick today?   No   Do you have allergies to medications, food, a vaccine component or latex?   No   Have you ever had a serious reaction after receiving a vaccination?   No   Do you have a long-term health problem with heart, lung, kidney, or metabolic disease (e.g., diabetes), asthma, a blood disorder, no spleen, complement component deficiency, a cochlear implant, or a spinal fluid leak?  Are you on long-term aspirin therapy?   No   Do you have cancer, leukemia, HIV/AIDS, or any other immune system problem?   No   Do you have a parent, brother, or sister with an immune system problem?   No   In the past 3 months, have you taken medications that affect  your immune system, such as prednisone, other steroids, or anticancer drugs; drugs for the treatment of rheumatoid arthritis, Crohn s disease, or psoriasis; or have you had radiation treatments?   No   Have you had a seizure, or a brain or other nervous system problem?   No   During the past year, have you received a transfusion of blood or blood    products, or been given immune (gamma) globulin or antiviral drug?   No   For women: Are you pregnant or is there a chance you could become       pregnant during the next month?   No   Have you received any vaccinations in the past 4 weeks?   No     Immunization questionnaire answers were all negative.        Per orders of Dr. Gar, injection of Shingrix given by Ami Tristan CMA. Patient instructed to remain in clinic for 15 minutes afterwards, and to report any adverse reaction to me immediately.       Screening performed by Ami Tristan CMA on 4/13/2021 at 7:54 AM.

## 2021-04-13 NOTE — Clinical Note
Please update - received flu vac in 11/2020 and [employer] COVID-19 vaccination on April 1.  Thank you

## 2021-04-20 ENCOUNTER — HOSPITAL ENCOUNTER (OUTPATIENT)
Dept: MAMMOGRAPHY | Facility: CLINIC | Age: 61
Discharge: HOME | End: 2021-04-20
Attending: FAMILY MEDICINE | Admitting: FAMILY MEDICINE
Payer: COMMERCIAL

## 2021-04-20 DIAGNOSIS — Z12.31 VISIT FOR SCREENING MAMMOGRAM: ICD-10-CM

## 2021-04-20 PROCEDURE — 77063 BREAST TOMOSYNTHESIS BI: CPT

## 2021-07-13 ENCOUNTER — MYC MEDICAL ADVICE (OUTPATIENT)
Dept: FAMILY MEDICINE | Facility: CLINIC | Age: 61
End: 2021-07-13

## 2021-07-23 ENCOUNTER — ALLIED HEALTH/NURSE VISIT (OUTPATIENT)
Dept: FAMILY MEDICINE | Facility: CLINIC | Age: 61
End: 2021-07-23
Payer: COMMERCIAL

## 2021-07-23 DIAGNOSIS — Z23 NEED FOR SHINGLES VACCINE: Primary | ICD-10-CM

## 2021-07-23 PROCEDURE — 90750 HZV VACC RECOMBINANT IM: CPT

## 2021-07-23 PROCEDURE — 90471 IMMUNIZATION ADMIN: CPT

## 2021-07-23 PROCEDURE — 99207 PR NO CHARGE NURSE ONLY: CPT

## 2021-07-23 NOTE — NURSING NOTE
Prior to immunization administration, verified patients identity using patient s name and date of birth. Please see Immunization Activity for additional information.     Screening Questionnaire for Adult Immunization    Are you sick today?   No   Do you have allergies to medications, food, a vaccine component or latex?   No   Have you ever had a serious reaction after receiving a vaccination?   No   Do you have a long-term health problem with heart, lung, kidney, or metabolic disease (e.g., diabetes), asthma, a blood disorder, no spleen, complement component deficiency, a cochlear implant, or a spinal fluid leak?  Are you on long-term aspirin therapy?   No   Do you have cancer, leukemia, HIV/AIDS, or any other immune system problem?   No   Do you have a parent, brother, or sister with an immune system problem?   No   In the past 3 months, have you taken medications that affect  your immune system, such as prednisone, other steroids, or anticancer drugs; drugs for the treatment of rheumatoid arthritis, Crohn s disease, or psoriasis; or have you had radiation treatments?   No   Have you had a seizure, or a brain or other nervous system problem?   No   During the past year, have you received a transfusion of blood or blood    products, or been given immune (gamma) globulin or antiviral drug?   No   For women: Are you pregnant or is there a chance you could become       pregnant during the next month?   No   Have you received any vaccinations in the past 4 weeks?   No     Immunization questionnaire answers were all negative.        Per orders of Dr. Gar, injection of Shingrix given by Melisa Tomlin CMA. Patient instructed to remain in clinic for 15 minutes afterwards, and to report any adverse reaction to me immediately.       Screening performed by Melisa Tomlin CMA on 7/23/2021 at 1:16 PM.

## 2021-08-12 ENCOUNTER — MYC REFILL (OUTPATIENT)
Dept: FAMILY MEDICINE | Facility: CLINIC | Age: 61
End: 2021-08-12

## 2021-08-12 DIAGNOSIS — F41.1 GENERALIZED ANXIETY DISORDER: ICD-10-CM

## 2021-08-12 RX ORDER — CITALOPRAM HYDROBROMIDE 10 MG/1
10 TABLET ORAL DAILY
Qty: 90 TABLET | Refills: 1 | Status: SHIPPED | OUTPATIENT
Start: 2021-08-12 | End: 2022-07-29

## 2021-08-12 NOTE — TELEPHONE ENCOUNTER
Prescription approved per [facility] Refill Protocol.    Yvonne Cheng RN

## 2021-09-18 ENCOUNTER — HEALTH MAINTENANCE LETTER (OUTPATIENT)
Age: 61
End: 2021-09-18

## 2022-04-22 ENCOUNTER — HOSPITAL ENCOUNTER (OUTPATIENT)
Dept: MAMMOGRAPHY | Facility: CLINIC | Age: 62
Discharge: HOME | End: 2022-04-22
Attending: FAMILY MEDICINE | Admitting: FAMILY MEDICINE
Payer: COMMERCIAL

## 2022-04-22 DIAGNOSIS — Z12.31 VISIT FOR SCREENING MAMMOGRAM: ICD-10-CM

## 2022-04-22 PROCEDURE — 77067 SCR MAMMO BI INCL CAD: CPT

## 2022-06-21 ENCOUNTER — APPOINTMENT (OUTPATIENT)
Dept: GENERAL RADIOLOGY | Facility: CLINIC | Age: 62
End: 2022-06-21
Attending: EMERGENCY MEDICINE
Payer: COMMERCIAL

## 2022-06-21 ENCOUNTER — HOSPITAL ENCOUNTER (EMERGENCY)
Facility: CLINIC | Age: 62
Discharge: HOME | End: 2022-06-21
Attending: EMERGENCY MEDICINE | Admitting: EMERGENCY MEDICINE
Payer: COMMERCIAL

## 2022-06-21 VITALS
HEART RATE: 104 BPM | OXYGEN SATURATION: 97 % | SYSTOLIC BLOOD PRESSURE: 156 MMHG | WEIGHT: 150 LBS | DIASTOLIC BLOOD PRESSURE: 88 MMHG | BODY MASS INDEX: 24.58 KG/M2 | RESPIRATION RATE: 18 BRPM | TEMPERATURE: 97 F

## 2022-06-21 DIAGNOSIS — S92.502A CLOSED FRACTURE OF PHALANX OF LEFT FOURTH TOE, INITIAL ENCOUNTER: ICD-10-CM

## 2022-06-21 DIAGNOSIS — S90.812A ABRASION OF LEFT FOOT, INITIAL ENCOUNTER: ICD-10-CM

## 2022-06-21 DIAGNOSIS — S40.011A CONTUSION OF RIGHT SHOULDER, INITIAL ENCOUNTER: ICD-10-CM

## 2022-06-21 DIAGNOSIS — S50.01XA CONTUSION OF RIGHT ELBOW, INITIAL ENCOUNTER: ICD-10-CM

## 2022-06-21 DIAGNOSIS — S70.01XA CONTUSION OF RIGHT HIP, INITIAL ENCOUNTER: ICD-10-CM

## 2022-06-21 DIAGNOSIS — S90.32XA CONTUSION OF LEFT FOOT, INITIAL ENCOUNTER: ICD-10-CM

## 2022-06-21 PROCEDURE — 73630 X-RAY EXAM OF FOOT: CPT | Mod: LT

## 2022-06-21 PROCEDURE — 99285 EMERGENCY DEPT VISIT HI MDM: CPT | Mod: 25 | Performed by: EMERGENCY MEDICINE

## 2022-06-21 PROCEDURE — 28510 TREATMENT OF TOE FRACTURE: CPT | Mod: 54 | Performed by: EMERGENCY MEDICINE

## 2022-06-21 PROCEDURE — 73080 X-RAY EXAM OF ELBOW: CPT | Mod: LT

## 2022-06-21 PROCEDURE — 73030 X-RAY EXAM OF SHOULDER: CPT | Mod: RT

## 2022-06-21 PROCEDURE — 99284 EMERGENCY DEPT VISIT MOD MDM: CPT | Mod: 25 | Performed by: EMERGENCY MEDICINE

## 2022-06-21 PROCEDURE — 73502 X-RAY EXAM HIP UNI 2-3 VIEWS: CPT

## 2022-06-21 PROCEDURE — 99282 EMERGENCY DEPT VISIT SF MDM: CPT | Mod: 25 | Performed by: EMERGENCY MEDICINE

## 2022-06-21 PROCEDURE — 28510 TREATMENT OF TOE FRACTURE: CPT | Mod: T3 | Performed by: EMERGENCY MEDICINE

## 2022-06-21 RX ORDER — OXYCODONE AND ACETAMINOPHEN 5; 325 MG/1; MG/1
1 TABLET ORAL EVERY 6 HOURS PRN
Qty: 12 TABLET | Refills: 0 | Status: SHIPPED | OUTPATIENT
Start: 2022-06-21 | End: 2022-06-24

## 2022-06-21 NOTE — ED TRIAGE NOTES
Pt presents with left foot pain after her horse trampled on it after getting spooked. Pt was knocked onto right side catching herself with her arm. Pt has right elbow pain.    Triage Assessment     Row Name 06/21/22 1413       Triage Assessment (Adult)    Airway WDL WDL       Respiratory WDL    Respiratory WDL WDL       Cardiac WDL    Cardiac WDL WDL

## 2022-06-21 NOTE — DISCHARGE INSTRUCTIONS
Surgical shoe for support.  Ice for swelling.  Watch for secondary infection.  Ibuprofen or Percocet for pain.  Range of motion of the elbow is pain allows.

## 2022-06-21 NOTE — ED PROVIDER NOTES
History     Chief Complaint   Patient presents with     Foot Pain     Elbow Pain     HPI  Diann Altman is a 61 year old female who presents with injuries to her left foot, right shoulder, right elbow, and right hip.  Patient states that her horse got spooked and unfortunately stomped on her right foot.  The horse then knocked her down and she landed on her right side injuring her shoulder, elbow and hip.  She is able ambulate thereafter but the left foot is quite painful and she is sure it is broken.  Her right elbow is moderately painful and she has limited range of motion due to the pain.  She does not think her shoulder and hip are broken.  She denies injury to her head or neck.  Denies any chest or abdominal pain.  She applied ice to her foot otherwise no treatment prior arrival.  Her tetanus is up-to-date.  She applied ice to her foot.  Allergies:  Allergies   Allergen Reactions     Clindamycin Hives       Problem List:    Patient Active Problem List    Diagnosis Date Noted     Counseling regarding advanced directives 09/08/2016     Priority: Medium     HYPERLIPIDEMIA LDL GOAL <130 10/31/2010     Priority: Medium        Past Medical History:    Past Medical History:   Diagnosis Date     AC (acromioclavicular) joint arthritis      NO ACTIVE PROBLEMS        Past Surgical History:    Past Surgical History:   Procedure Laterality Date     EYE SURGERY  as child    bilat.       Family History:    Family History   Problem Relation Age of Onset     Heart Disease Father      Lipids Father      Diabetes Maternal Grandmother      Eye Disorder Maternal Grandmother         cataract     Heart Disease Maternal Grandmother      Arthritis Maternal Grandmother      Hypertension Maternal Grandmother      Hypertension Sister      Lipids Sister      Gynecology Sister         pelvic inflam disease     Allergies Mother      Respiratory Mother         emphysema     Heart Disease Mother         COPD     Arthritis Paternal Grandmother       Eye Disorder Paternal Grandmother         cataract     Gastrointestinal Disease Paternal Grandmother         diverticulitis     Lipids Paternal Grandfather      Diabetes Paternal Grandfather      Heart Disease Maternal Grandfather      Cardiovascular Maternal Grandfather         MI     No Known Problems Son        Social History:  Marital Status:   [2]  Social History     Tobacco Use     Smoking status: Former Smoker     Quit date: 1987     Years since quittin.4     Smokeless tobacco: Never Used   Substance Use Topics     Alcohol use: Yes     Comment: 1-2 uses/year     Drug use: No        Medications:    oxyCODONE-acetaminophen (PERCOCET) 5-325 MG tablet  citalopram (CELEXA) 10 MG tablet          Review of Systems all other systems are reviewed and are negative.    Physical Exam   BP: (!) 156/88  Pulse: 104  Temp: 97  F (36.1  C)  Resp: 18  Weight: 68 kg (150 lb)  SpO2: 97 %      Physical Exam alert cooperative female in mild to moderate stress but HEENT is atraumatic.  Neck and back are nontender.  Chest and clavicle are nontender.  She has a contusion over her shoulder which is tender but no crepitus or step-off is felt.  Over her elbow she is tender and bruised.  Limited range of motion due to pain.  Wrist and hand are unaffected.  With pelvic rocking does not have significant tenderness but with palpation over the lateral right hip proximately she has tenderness.  No crepitus felt.  Remainder the right leg is negative.  On her left foot she has some abrasion and bruising over the base of the third, fourth and fifth toes.  The nails are unaffected.        ED Course                 Procedures              Critical Care time:  none               Results for orders placed or performed during the hospital encounter of 22 (from the past 24 hour(s))   XR Elbow Left G/E 3 Views    Narrative    LEFT ELBOW THREE OR MORE VIEWS    2022 2:49 PM     HISTORY: Pain after fall.    COMPARISON:  None.      Impression    IMPRESSION: No acute fracture or joint effusion. Normal joint spacing.     XR Shoulder Right G/E 3 Views    Narrative    SHOULDER RIGHT THREE OR MORE VIEWS   6/21/2022 2:49 PM     HISTORY: Pain after fall.    COMPARISON: 11/28/2012 x-ray.      Impression    IMPRESSION: Moderate acromioclavicular degenerative changes.  Glenohumeral joint space appears preserved. No fracture or  dislocation.   XR Pelvis and Hip Right 1 View    Narrative    PELVIS AND HIP RIGHT ONE VIEW   6/21/2022 2:49 PM     HISTORY: Pain after fall.    COMPARISON: None.      Impression    IMPRESSION: Hip joint spaces appear preserved. Marginal osteophyte  formation right hip indicating mild-to-moderate degenerative change.  No fracture. Incidental note made of fallopian tube obstruction  devices in the pelvis.   XR Foot Left G/E 3 Views    Narrative    LEFT FOOT THREE OR MORE VIEWS  6/21/2022 2:50 PM     HISTORY: Pain after injury.    COMPARISON: None.      Impression    IMPRESSION: Subtle nondisplaced fracture lateral base middle phalanx  fourth toe.    Normal joint spacing. Mild cortical thickening distal medial second  metatarsal appears chronic and likely related to prior injury.       Medications - No data to display  X-rays of the shoulder, elbow, pelvis/right hip, and foot are ordered.  She deferred pain meds.  Assessments & Plan (with Medical Decision Making)   Diann Altman is a 61 year old female who presents with injuries to her left foot, right shoulder, right elbow, and right hip.  Patient states that her horse got spooked and unfortunately stomped on her right foot.  The horse then knocked her down and she landed on her right side injuring her shoulder, elbow and hip.  She is able ambulate thereafter but the left foot is quite painful and she is sure it is broken.  Her right elbow is moderately painful and she has limited range of motion due to the pain.  She does not think her shoulder and hip are broken.  She  denies injury to her head or neck.  Denies any chest or abdominal pain.  She applied ice to her foot otherwise no treatment prior arrival.  Her tetanus is up-to-date.  She applied ice to her foot.  On presentation patient was afebrile vitally stable.  She had a abrasion/contusion to her right shoulder with full range of motion.  No joint effusion.  She had bruising and swelling of her elbow with limited range of motion due to pain.  The wrist and hand are unaffected.  Over her right hip she is tender without crepitus.  Full range of motion.  No pain with pelvic rocking.  Her left foot is swollen, contused and a braised.  X-rays of the shoulder, elbow and hip did not show acute fractures.  The foot x-ray initially was read as negative but with further review there is a cortical disruption and displacement of the fourth toe.  Patient was given a surgical shoe for support and she was able ambulate with some discomfort.  Antibiotic and dressing were applied.  Does not appear to be an open fracture.  She can take ibuprofen or Percocet for pain.  Ice for swelling.  She deferred crutches or a sling for her elbow.  I have reviewed the nursing notes.    I have reviewed the findings, diagnosis, plan and need for follow up with the patient.       New Prescriptions    OXYCODONE-ACETAMINOPHEN (PERCOCET) 5-325 MG TABLET    Take 1 tablet by mouth every 6 hours as needed for pain       Final diagnoses:   Contusion of right shoulder, initial encounter   Contusion of right elbow, initial encounter   Contusion of right hip, initial encounter   Contusion of left foot, initial encounter   Abrasion of left foot, initial encounter   Closed fracture of phalanx of left fourth toe, initial encounter       6/21/2022   [facility] EMERGENCY DEPT     Israel Trujillo MD  06/21/22 6349

## 2022-06-25 ENCOUNTER — HEALTH MAINTENANCE LETTER (OUTPATIENT)
Age: 62
End: 2022-06-25

## 2022-07-06 ENCOUNTER — DOCUMENTATION ONLY (OUTPATIENT)
Facility: CLINIC | Age: 62
End: 2022-07-06

## 2022-07-06 DIAGNOSIS — S92.502A DISPLACED UNSPECIFIED FRACTURE OF LEFT LESSER TOE(S), INITIAL ENCOUNTER FOR CLOSED FRACTURE: Primary | ICD-10-CM

## 2022-07-06 DIAGNOSIS — Z53.9 ERRONEOUS ENCOUNTER--DISREGARD: ICD-10-CM

## 2022-07-06 DIAGNOSIS — S90.812A ABRASION OF LEFT FOOT, INITIAL ENCOUNTER: ICD-10-CM

## 2022-07-06 DIAGNOSIS — S90.32XA CONTUSION OF LEFT FOOT, INITIAL ENCOUNTER: ICD-10-CM

## 2022-07-26 ASSESSMENT — ENCOUNTER SYMPTOMS
NAUSEA: 0
COUGH: 0
PALPITATIONS: 0
FREQUENCY: 0
ABDOMINAL PAIN: 0
WEAKNESS: 0
SHORTNESS OF BREATH: 0
FEVER: 0
SORE THROAT: 0
PARESTHESIAS: 0
JOINT SWELLING: 0
HEARTBURN: 0
DIZZINESS: 0
NERVOUS/ANXIOUS: 0
DIARRHEA: 0
CONSTIPATION: 0
MYALGIAS: 0
HEADACHES: 0
HEMATURIA: 0
DYSURIA: 0
EYE PAIN: 0
CHILLS: 0
HEMATOCHEZIA: 0
BREAST MASS: 0
ARTHRALGIAS: 0

## 2022-07-29 ENCOUNTER — OFFICE VISIT (OUTPATIENT)
Dept: INTERNAL MEDICINE | Facility: CLINIC | Age: 62
End: 2022-07-29
Payer: COMMERCIAL

## 2022-07-29 VITALS
OXYGEN SATURATION: 98 % | TEMPERATURE: 97.7 F | SYSTOLIC BLOOD PRESSURE: 138 MMHG | RESPIRATION RATE: 18 BRPM | HEART RATE: 94 BPM | HEIGHT: 66 IN | DIASTOLIC BLOOD PRESSURE: 84 MMHG | WEIGHT: 149 LBS | BODY MASS INDEX: 23.95 KG/M2

## 2022-07-29 DIAGNOSIS — Z12.11 SCREEN FOR COLON CANCER: ICD-10-CM

## 2022-07-29 DIAGNOSIS — Z00.00 ROUTINE GENERAL MEDICAL EXAMINATION AT A HEALTH CARE FACILITY: Primary | ICD-10-CM

## 2022-07-29 DIAGNOSIS — Z13.220 SCREENING FOR HYPERLIPIDEMIA: ICD-10-CM

## 2022-07-29 LAB
ALBUMIN SERPL-MCNC: 4.3 G/DL (ref 3.4–5)
ALBUMIN UR-MCNC: NEGATIVE MG/DL
ALP SERPL-CCNC: 60 U/L (ref 40–150)
ALT SERPL W P-5'-P-CCNC: 24 U/L (ref 0–50)
ANION GAP SERPL CALCULATED.3IONS-SCNC: 5 MMOL/L (ref 3–14)
APPEARANCE UR: CLEAR
AST SERPL W P-5'-P-CCNC: 12 U/L (ref 0–45)
BILIRUB SERPL-MCNC: 0.5 MG/DL (ref 0.2–1.3)
BILIRUB UR QL STRIP: NEGATIVE
BUN SERPL-MCNC: 11 MG/DL (ref 7–30)
CALCIUM SERPL-MCNC: 9.5 MG/DL (ref 8.5–10.1)
CHLORIDE BLD-SCNC: 111 MMOL/L (ref 94–109)
CHOLEST SERPL-MCNC: 209 MG/DL
CO2 SERPL-SCNC: 28 MMOL/L (ref 20–32)
COLOR UR AUTO: YELLOW
CREAT SERPL-MCNC: 0.79 MG/DL (ref 0.52–1.04)
ERYTHROCYTE [DISTWIDTH] IN BLOOD BY AUTOMATED COUNT: 12.5 % (ref 10–15)
FASTING STATUS PATIENT QL REPORTED: YES
GFR SERPL CREATININE-BSD FRML MDRD: 85 ML/MIN/1.73M2
GLUCOSE BLD-MCNC: 91 MG/DL (ref 70–99)
GLUCOSE UR STRIP-MCNC: NEGATIVE MG/DL
HCT VFR BLD AUTO: 45.9 % (ref 35–47)
HDLC SERPL-MCNC: 61 MG/DL
HGB BLD-MCNC: 15.1 G/DL (ref 11.7–15.7)
HGB UR QL STRIP: ABNORMAL
KETONES UR STRIP-MCNC: NEGATIVE MG/DL
LDLC SERPL CALC-MCNC: 111 MG/DL
LEUKOCYTE ESTERASE UR QL STRIP: ABNORMAL
MCH RBC QN AUTO: 31.3 PG (ref 26.5–33)
MCHC RBC AUTO-ENTMCNC: 32.9 G/DL (ref 31.5–36.5)
MCV RBC AUTO: 95 FL (ref 78–100)
NITRATE UR QL: NEGATIVE
NONHDLC SERPL-MCNC: 148 MG/DL
PH UR STRIP: 7.5 [PH] (ref 5–7)
PLATELET # BLD AUTO: 245 10E3/UL (ref 150–450)
POTASSIUM BLD-SCNC: 4.1 MMOL/L (ref 3.4–5.3)
PROT SERPL-MCNC: 7.7 G/DL (ref 6.8–8.8)
RBC # BLD AUTO: 4.83 10E6/UL (ref 3.8–5.2)
RBC URINE: 11 /HPF
SODIUM SERPL-SCNC: 144 MMOL/L (ref 133–144)
SP GR UR STRIP: 1.01 (ref 1–1.03)
SQUAMOUS EPITHELIAL: <1 /HPF
TRIGL SERPL-MCNC: 185 MG/DL
UROBILINOGEN UR STRIP-MCNC: NORMAL MG/DL
WBC # BLD AUTO: 6.6 10E3/UL (ref 4–11)
WBC URINE: 1 /HPF

## 2022-07-29 PROCEDURE — 81001 URINALYSIS AUTO W/SCOPE: CPT | Performed by: INTERNAL MEDICINE

## 2022-07-29 PROCEDURE — 36415 COLL VENOUS BLD VENIPUNCTURE: CPT | Performed by: INTERNAL MEDICINE

## 2022-07-29 PROCEDURE — 80053 COMPREHEN METABOLIC PANEL: CPT | Performed by: INTERNAL MEDICINE

## 2022-07-29 PROCEDURE — 80061 LIPID PANEL: CPT | Performed by: INTERNAL MEDICINE

## 2022-07-29 PROCEDURE — 99396 PREV VISIT EST AGE 40-64: CPT | Performed by: INTERNAL MEDICINE

## 2022-07-29 PROCEDURE — 85027 COMPLETE CBC AUTOMATED: CPT | Performed by: INTERNAL MEDICINE

## 2022-07-29 ASSESSMENT — ENCOUNTER SYMPTOMS
ARTHRALGIAS: 0
HEMATURIA: 0
FREQUENCY: 0
MYALGIAS: 0
DIARRHEA: 0
DYSURIA: 0
CONSTIPATION: 0
DIZZINESS: 0
COUGH: 0
WEAKNESS: 0
BREAST MASS: 0
JOINT SWELLING: 0
SHORTNESS OF BREATH: 0
HEMATOCHEZIA: 0
PALPITATIONS: 0
CHILLS: 0
SORE THROAT: 0
NERVOUS/ANXIOUS: 0
EYE PAIN: 0
PARESTHESIAS: 0
FEVER: 0
HEADACHES: 0
HEARTBURN: 0
NAUSEA: 0
ABDOMINAL PAIN: 0

## 2022-07-29 ASSESSMENT — PAIN SCALES - GENERAL: PAINLEVEL: NO PAIN (0)

## 2022-07-29 NOTE — PROGRESS NOTES
SUBJECTIVE:   CC: Diann Altman is an 62 year old woman who presents for preventive health visit.     Patient has been advised of split billing requirements and indicates understanding: Yes  Healthy Habits:     Getting at least 3 servings of Calcium per day:  Yes    Bi-annual eye exam:  Yes    Dental care twice a year:  Yes    Sleep apnea or symptoms of sleep apnea:  None    Diet:  Regular (no restrictions)    Frequency of exercise:  4-5 days/week    Duration of exercise:  15-30 minutes    Taking medications regularly:  Yes    Medication side effects:  Not applicable    PHQ-2 Total Score: 0    Additional concerns today:  No    -------------------------------------    Today's PHQ-2 Score:   PHQ-2 (  [employer]) 2022   Q1: Little interest or pleasure in doing things 0   Q2: Feeling down, depressed or hopeless 0   PHQ-2 Score 0   PHQ-2 Total Score (12-17 Years)- Positive if 3 or more points; Administer PHQ-A if positive -   Q1: Little interest or pleasure in doing things Not at all   Q2: Feeling down, depressed or hopeless Not at all   PHQ-2 Score 0       Abuse: Current or Past (Physical, Sexual or Emotional) - No  Do you feel safe in your environment? Yes    Have you ever done Advance Care Planning? (For example, a Health Directive, POLST, or a discussion with a medical provider or your loved ones about your wishes): No, advance care planning information given to patient to review.  Patient plans to discuss their wishes with loved ones or provider.      Social History     Tobacco Use     Smoking status: Former Smoker     Quit date: 1987     Years since quittin.5     Smokeless tobacco: Never Used   Substance Use Topics     Alcohol use: Yes     Comment: 1-2 uses/year     If you drink alcohol do you typically have >3 drinks per day or >7 drinks per week? No    Alcohol Use 2022   Prescreen: >3 drinks/day or >7 drinks/week? No   Prescreen: >3 drinks/day or >7 drinks/week? -       Reviewed orders with  patient.  Reviewed health maintenance and updated orders accordingly - Yes  Lab work is in process  BP Readings from Last 3 Encounters:   22 138/84   22 (!) 156/88   21 124/76    Wt Readings from Last 3 Encounters:   22 67.6 kg (149 lb)   22 68 kg (150 lb)   21 67.9 kg (149 lb 9.6 oz)                  Patient Active Problem List   Diagnosis     HYPERLIPIDEMIA LDL GOAL <130     Counseling regarding advanced directives     Past Surgical History:   Procedure Laterality Date     EYE SURGERY  as child    bilat.       Social History     Tobacco Use     Smoking status: Former Smoker     Quit date: 1987     Years since quittin.5     Smokeless tobacco: Never Used   Substance Use Topics     Alcohol use: Yes     Comment: 1-2 uses/year     Family History   Problem Relation Age of Onset     Heart Disease Father      Lipids Father      Diabetes Maternal Grandmother      Eye Disorder Maternal Grandmother         cataract     Heart Disease Maternal Grandmother      Arthritis Maternal Grandmother      Hypertension Maternal Grandmother      Hypertension Sister      Lipids Sister      Gynecology Sister         pelvic inflam disease     Allergies Mother      Respiratory Mother         emphysema     Heart Disease Mother         COPD     Arthritis Paternal Grandmother      Eye Disorder Paternal Grandmother         cataract     Gastrointestinal Disease Paternal Grandmother         diverticulitis     Lipids Paternal Grandfather      Diabetes Paternal Grandfather      Heart Disease Maternal Grandfather      Cardiovascular Maternal Grandfather         MI     No Known Problems Son          No current outpatient medications on file.     Allergies   Allergen Reactions     Clindamycin Hives       Breast Cancer Screening:    Breast CA Risk Assessment (FHS-7) 4/10/2021   Do you have a family history of breast, colon, or ovarian cancer? No / Unknown         Mammogram Screening: Recommended mammography  every 1-2 years with patient discussion and risk factor consideration  Pertinent mammograms are reviewed under the imaging tab.    History of abnormal Pap smear: NO - age 30-65 PAP every 5 years with negative HPV co-testing recommended  PAP / HPV Latest Ref Rng & Units 2012   PAP (Historical) - NIL NIL NIL   HPV16 NEG:Negative Negative - -   HPV18 NEG:Negative Negative - -   HRHPV NEG:Negative Negative - -     Reviewed and updated as needed this visit by clinical staff   Tobacco  Allergies  Meds   Med Hx  Surg Hx  Fam Hx  Soc Hx          Reviewed and updated as needed this visit by Provider                   Past Medical History:   Diagnosis Date     AC (acromioclavicular) joint arthritis      NO ACTIVE PROBLEMS       Past Surgical History:   Procedure Laterality Date     EYE SURGERY  as child    bilat.     OB History    Para Term  AB Living   2 2 2 0 0 2   SAB IAB Ectopic Multiple Live Births   0 0 0 0 0      # Outcome Date GA Lbr Luis Carlos/2nd Weight Sex Delivery Anes PTL Lv   2 Term            1 Term                Review of Systems   Constitutional: Negative for chills and fever.   HENT: Negative for congestion, ear pain, hearing loss and sore throat.    Eyes: Negative for pain and visual disturbance.   Respiratory: Negative for cough and shortness of breath.    Cardiovascular: Negative for chest pain, palpitations and peripheral edema.   Gastrointestinal: Negative for abdominal pain, constipation, diarrhea, heartburn, hematochezia and nausea.   Breasts:  Negative for tenderness, breast mass and discharge.   Genitourinary: Negative for dysuria, frequency, genital sores, hematuria, pelvic pain, urgency, vaginal bleeding and vaginal discharge.   Musculoskeletal: Negative for arthralgias, joint swelling and myalgias.   Skin: Negative for rash.   Neurological: Negative for dizziness, weakness, headaches and paresthesias.   Psychiatric/Behavioral: Negative for mood changes. The  "patient is not nervous/anxious.      CONSTITUTIONAL: NEGATIVE for fever, chills, change in weight  INTEGUMENTARY/SKIN: NEGATIVE for worrisome rashes, moles or lesions  EYES: NEGATIVE for vision changes or irritation  ENT: NEGATIVE for ear, mouth and throat problems  RESP: NEGATIVE for significant cough or SOB  BREAST: NEGATIVE for masses, tenderness or discharge  CV: NEGATIVE for chest pain, palpitations or peripheral edema  GI: NEGATIVE for nausea, abdominal pain, heartburn, or change in bowel habits  : NEGATIVE for unusual urinary or vaginal symptoms. No vaginal bleeding.  MUSCULOSKELETAL: NEGATIVE for significant arthralgias or myalgia  NEURO: NEGATIVE for weakness, dizziness or paresthesias  PSYCHIATRIC: NEGATIVE for changes in mood or affect      OBJECTIVE:   /84 (BP Location: Right arm, Patient Position: Sitting, Cuff Size: Adult Large)   Pulse 94   Temp 97.7  F (36.5  C) (Temporal)   Resp 18   Ht 1.664 m (5' 5.5\")   Wt 67.6 kg (149 lb)   LMP 12/04/2016   SpO2 98%   BMI 24.42 kg/m    Physical Exam  GENERAL APPEARANCE: healthy, alert and no distress  EYES: Eyes grossly normal to inspection, PERRL and conjunctivae and sclerae normal  HENT: ear canals and TM's normal, nose and mouth without ulcers or lesions, oropharynx clear and oral mucous membranes moist  NECK: no adenopathy, no asymmetry, masses, or scars and thyroid normal to palpation  RESP: lungs clear to auscultation - no rales, rhonchi or wheezes  CV: regular rate and rhythm, normal S1 S2, no S3 or S4, no murmur, click or rub, no peripheral edema and peripheral pulses strong  ABDOMEN: soft, nontender, no hepatosplenomegaly, no masses and bowel sounds normal  MS: no musculoskeletal defects are noted and gait is age appropriate without ataxia  SKIN: no suspicious lesions or rashes  NEURO: Normal strength and tone, sensory exam grossly normal, mentation intact and speech normal  PSYCH: mentation appears normal and affect " "normal/bright    Diagnostic Test Results:  No results found for this or any previous visit (from the past 24 hour(s)).    ASSESSMENT/PLAN:       ICD-10-CM    1. Routine general medical examination at a health care facility  Z00.00 CBC with platelets     UA Macro with Reflex to Micro and Culture - lab collect     Comprehensive metabolic panel (BMP + Alb, Alk Phos, ALT, AST, Total. Bili, TP)   2. Screen for colon cancer  Z12.11 Fecal colorectal cancer screen FIT - Future (S+30)   3. Screening for hyperlipidemia  Z13.220 Lipid panel reflex to direct LDL Non-fasting       Patient has been advised of split billing requirements and indicates understanding: Yes    COUNSELING:  Reviewed preventive health counseling, as reflected in patient instructions       Regular exercise       Healthy diet/nutrition       Vision screening       Hearing screening       Colorectal Cancer Screening    Estimated body mass index is 24.42 kg/m  as calculated from the following:    Height as of this encounter: 1.664 m (5' 5.5\").    Weight as of this encounter: 67.6 kg (149 lb).        She reports that she quit smoking about 35 years ago. She has never used smokeless tobacco.      Counseling Resources:  ATP IV Guidelines  Pooled Cohorts Equation Calculator  Breast Cancer Risk Calculator  BRCA-Related Cancer Risk Assessment: FHS-7 Tool  FRAX Risk Assessment  ICSI Preventive Guidelines  Dietary Guidelines for Americans, 2010  USDA's MyPlate  ASA Prophylaxis  Lung CA Screening    Francisco Kumar DO  [facility]  "

## 2022-11-20 ENCOUNTER — HEALTH MAINTENANCE LETTER (OUTPATIENT)
Age: 62
End: 2022-11-20

## 2023-05-02 ENCOUNTER — HOSPITAL ENCOUNTER (EMERGENCY)
Facility: CLINIC | Age: 63
Discharge: HOME | End: 2023-05-02
Attending: EMERGENCY MEDICINE | Admitting: EMERGENCY MEDICINE
Payer: COMMERCIAL

## 2023-05-02 ENCOUNTER — APPOINTMENT (OUTPATIENT)
Dept: CT IMAGING | Facility: CLINIC | Age: 63
End: 2023-05-02
Attending: EMERGENCY MEDICINE
Payer: COMMERCIAL

## 2023-05-02 VITALS
DIASTOLIC BLOOD PRESSURE: 69 MMHG | SYSTOLIC BLOOD PRESSURE: 128 MMHG | WEIGHT: 155 LBS | BODY MASS INDEX: 25.4 KG/M2 | HEART RATE: 66 BPM | TEMPERATURE: 98 F | OXYGEN SATURATION: 97 % | RESPIRATION RATE: 13 BRPM

## 2023-05-02 DIAGNOSIS — R42 VERTIGO: ICD-10-CM

## 2023-05-02 LAB
ANION GAP SERPL CALCULATED.3IONS-SCNC: 12 MMOL/L (ref 7–15)
BASOPHILS # BLD AUTO: 0 10E3/UL (ref 0–0.2)
BASOPHILS NFR BLD AUTO: 0 %
BUN SERPL-MCNC: 14.6 MG/DL (ref 8–23)
CALCIUM SERPL-MCNC: 9.5 MG/DL (ref 8.8–10.2)
CHLORIDE SERPL-SCNC: 105 MMOL/L (ref 98–107)
CREAT SERPL-MCNC: 0.69 MG/DL (ref 0.51–0.95)
DEPRECATED HCO3 PLAS-SCNC: 24 MMOL/L (ref 22–29)
EOSINOPHIL # BLD AUTO: 0.1 10E3/UL (ref 0–0.7)
EOSINOPHIL NFR BLD AUTO: 1 %
ERYTHROCYTE [DISTWIDTH] IN BLOOD BY AUTOMATED COUNT: 12.8 % (ref 10–15)
GFR SERPL CREATININE-BSD FRML MDRD: >90 ML/MIN/1.73M2
GLUCOSE SERPL-MCNC: 132 MG/DL (ref 70–99)
HCT VFR BLD AUTO: 44.3 % (ref 35–47)
HGB BLD-MCNC: 14.5 G/DL (ref 11.7–15.7)
IMM GRANULOCYTES # BLD: 0 10E3/UL
IMM GRANULOCYTES NFR BLD: 0 %
LYMPHOCYTES # BLD AUTO: 2.7 10E3/UL (ref 0.8–5.3)
LYMPHOCYTES NFR BLD AUTO: 31 %
MCH RBC QN AUTO: 30.3 PG (ref 26.5–33)
MCHC RBC AUTO-ENTMCNC: 32.7 G/DL (ref 31.5–36.5)
MCV RBC AUTO: 93 FL (ref 78–100)
MONOCYTES # BLD AUTO: 0.8 10E3/UL (ref 0–1.3)
MONOCYTES NFR BLD AUTO: 10 %
NEUTROPHILS # BLD AUTO: 5 10E3/UL (ref 1.6–8.3)
NEUTROPHILS NFR BLD AUTO: 58 %
NRBC # BLD AUTO: 0 10E3/UL
NRBC BLD AUTO-RTO: 0 /100
PLATELET # BLD AUTO: 263 10E3/UL (ref 150–450)
POTASSIUM SERPL-SCNC: 3.5 MMOL/L (ref 3.4–5.3)
RBC # BLD AUTO: 4.79 10E6/UL (ref 3.8–5.2)
SODIUM SERPL-SCNC: 141 MMOL/L (ref 136–145)
WBC # BLD AUTO: 8.7 10E3/UL (ref 4–11)

## 2023-05-02 PROCEDURE — 93010 ELECTROCARDIOGRAM REPORT: CPT | Performed by: EMERGENCY MEDICINE

## 2023-05-02 PROCEDURE — 258N000003 HC RX IP 258 OP 636: Performed by: EMERGENCY MEDICINE

## 2023-05-02 PROCEDURE — 93005 ELECTROCARDIOGRAM TRACING: CPT

## 2023-05-02 PROCEDURE — 70450 CT HEAD/BRAIN W/O DYE: CPT

## 2023-05-02 PROCEDURE — 250N000011 HC RX IP 250 OP 636: Performed by: EMERGENCY MEDICINE

## 2023-05-02 PROCEDURE — 80048 BASIC METABOLIC PNL TOTAL CA: CPT | Performed by: EMERGENCY MEDICINE

## 2023-05-02 PROCEDURE — 85004 AUTOMATED DIFF WBC COUNT: CPT | Performed by: EMERGENCY MEDICINE

## 2023-05-02 PROCEDURE — 96374 THER/PROPH/DIAG INJ IV PUSH: CPT

## 2023-05-02 PROCEDURE — 96361 HYDRATE IV INFUSION ADD-ON: CPT

## 2023-05-02 PROCEDURE — 250N000013 HC RX MED GY IP 250 OP 250 PS 637: Performed by: EMERGENCY MEDICINE

## 2023-05-02 PROCEDURE — 99285 EMERGENCY DEPT VISIT HI MDM: CPT | Mod: 25

## 2023-05-02 PROCEDURE — 36415 COLL VENOUS BLD VENIPUNCTURE: CPT | Performed by: EMERGENCY MEDICINE

## 2023-05-02 RX ORDER — MECLIZINE HYDROCHLORIDE 25 MG/1
25 TABLET ORAL ONCE
Status: COMPLETED | OUTPATIENT
Start: 2023-05-02 | End: 2023-05-02

## 2023-05-02 RX ORDER — ONDANSETRON 2 MG/ML
4 INJECTION INTRAMUSCULAR; INTRAVENOUS EVERY 30 MIN PRN
Status: DISCONTINUED | OUTPATIENT
Start: 2023-05-02 | End: 2023-05-02 | Stop reason: HOSPADM

## 2023-05-02 RX ORDER — SODIUM CHLORIDE 9 MG/ML
INJECTION, SOLUTION INTRAVENOUS CONTINUOUS
Status: DISCONTINUED | OUTPATIENT
Start: 2023-05-02 | End: 2023-05-02 | Stop reason: HOSPADM

## 2023-05-02 RX ORDER — MECLIZINE HYDROCHLORIDE 25 MG/1
25 TABLET ORAL 3 TIMES DAILY PRN
Qty: 20 TABLET | Refills: 0 | Status: SHIPPED | OUTPATIENT
Start: 2023-05-02 | End: 2023-07-19

## 2023-05-02 RX ORDER — ONDANSETRON 4 MG/1
4 TABLET, ORALLY DISINTEGRATING ORAL EVERY 8 HOURS PRN
Qty: 10 TABLET | Refills: 0 | Status: SHIPPED | OUTPATIENT
Start: 2023-05-02 | End: 2023-07-19

## 2023-05-02 RX ADMIN — MECLIZINE HYDROCHLORIDE 25 MG: 25 TABLET ORAL at 20:03

## 2023-05-02 RX ADMIN — ONDANSETRON 4 MG: 2 INJECTION INTRAMUSCULAR; INTRAVENOUS at 20:04

## 2023-05-02 RX ADMIN — SODIUM CHLORIDE 1000 ML: 9 INJECTION, SOLUTION INTRAVENOUS at 20:02

## 2023-05-02 ASSESSMENT — ACTIVITIES OF DAILY LIVING (ADL): ADLS_ACUITY_SCORE: 35

## 2023-05-03 NOTE — ED PROVIDER NOTES
History     Chief Complaint   Patient presents with     Tachycardia     Dizziness     HPI  Diann Altman is a 62 year old female who presents to the emergency department secondary to dizziness/vertigo that occurs with change in position gets better with rest.  It feels like seasickness.  It is worsened by sitting up or standing while laying down.  She has had episodes of tachycardia associated with it as well.  This settles down when she sits down again.  She has not had any visual disturbance.  No headache, focal neurologic weakness, new numbness.  She has had some ear fullness but no ear pain.  No recent URI symptoms, fever, cough, abdominal pain.  She has had nausea without vomiting.  No dysuria or hematuria.  No history of vertigo or CVA.  Otherwise healthy.  No history of coronary artery disease.  She has not tried anything for symptoms.  This started on Sunday and have been intermittent and coming and going.  She does have a tendency to go white coat syndrome with tachycardia and hypertension.    Allergies:  Allergies   Allergen Reactions     Clindamycin Hives       Problem List:    Patient Active Problem List    Diagnosis Date Noted     Counseling regarding advanced directives 09/08/2016     Priority: Medium     HYPERLIPIDEMIA LDL GOAL <130 10/31/2010     Priority: Medium        Past Medical History:    Past Medical History:   Diagnosis Date     AC (acromioclavicular) joint arthritis      NO ACTIVE PROBLEMS        Past Surgical History:    Past Surgical History:   Procedure Laterality Date     EYE SURGERY  as child    bilat.       Family History:    Family History   Problem Relation Age of Onset     Heart Disease Father      Lipids Father      Diabetes Maternal Grandmother      Eye Disorder Maternal Grandmother         cataract     Heart Disease Maternal Grandmother      Arthritis Maternal Grandmother      Hypertension Maternal Grandmother      Hypertension Sister      Lipids Sister      Gynecology Sister          pelvic inflam disease     Allergies Mother      Respiratory Mother         emphysema     Heart Disease Mother         COPD     Arthritis Paternal Grandmother      Eye Disorder Paternal Grandmother         cataract     Gastrointestinal Disease Paternal Grandmother         diverticulitis     Lipids Paternal Grandfather      Diabetes Paternal Grandfather      Heart Disease Maternal Grandfather      Cardiovascular Maternal Grandfather         MI     No Known Problems Son        Social History:  Marital Status:   [2]  Social History     Tobacco Use     Smoking status: Former     Types: Cigarettes     Quit date: 1987     Years since quittin.3     Smokeless tobacco: Never   Vaping Use     Vaping status: Never Used   Substance Use Topics     Alcohol use: Yes     Comment: 1-2 uses/year     Drug use: No        Medications:    meclizine (ANTIVERT) 25 MG tablet  ondansetron (ZOFRAN ODT) 4 MG ODT tab          Review of Systems   All other systems reviewed and are negative.      Physical Exam   BP: (!) 199/105  Pulse: (!) 130  Temp: 98  F (36.7  C)  Resp: 20  Weight: 70.3 kg (155 lb)  SpO2: 94 %      Physical Exam  Vitals and nursing note reviewed.   Constitutional:       General: She is not in acute distress.     Appearance: Normal appearance. She is well-developed.   HENT:      Head: Normocephalic and atraumatic.      Mouth/Throat:      Mouth: Mucous membranes are moist.   Eyes:      General: No scleral icterus.     Conjunctiva/sclera: Conjunctivae normal.   Cardiovascular:      Rate and Rhythm: Tachycardia present.   Pulmonary:      Effort: Pulmonary effort is normal. No respiratory distress.   Abdominal:      General: Abdomen is flat.      Tenderness: There is no abdominal tenderness. There is no guarding or rebound.   Musculoskeletal:         General: Normal range of motion.      Cervical back: Normal range of motion and neck supple.      Right lower leg: No edema.      Left lower leg: No edema.    Skin:     General: Skin is warm and dry.      Findings: No rash.   Neurological:      General: No focal deficit present.      Mental Status: She is alert and oriented to person, place, and time.   Psychiatric:         Mood and Affect: Mood normal.         Thought Content: Thought content normal.         ED Course                 Procedures              EKG Interpretation:      Interpreted by Matthieu Luna MD  Time reviewed: 1939  Symptoms at time of EKG: vertigo   Rhythm: normal sinus   Rate: normal  Axis: normal  Ectopy: none  Conduction: normal  ST Segments/ T Waves: No ST-T wave changes  Q Waves: none  Comparison to prior: No old EKG available    Clinical Impression: normal EKG                 Results for orders placed or performed during the hospital encounter of 05/02/23 (from the past 24 hour(s))   CBC with platelets differential    Narrative    The following orders were created for panel order CBC with platelets differential.  Procedure                               Abnormality         Status                     ---------                               -----------         ------                     CBC with platelets and d...[[id number]]                      Final result                 Please view results for these tests on the individual orders.   Basic metabolic panel   Result Value Ref Range    Sodium 141 136 - 145 mmol/L    Potassium 3.5 3.4 - 5.3 mmol/L    Chloride 105 98 - 107 mmol/L    Carbon Dioxide (CO2) 24 22 - 29 mmol/L    Anion Gap 12 7 - 15 mmol/L    Urea Nitrogen 14.6 8.0 - 23.0 mg/dL    Creatinine 0.69 0.51 - 0.95 mg/dL    Calcium 9.5 8.8 - 10.2 mg/dL    Glucose 132 (H) 70 - 99 mg/dL    GFR Estimate >90 >60 mL/min/1.73m2   CBC with platelets and differential   Result Value Ref Range    WBC Count 8.7 4.0 - 11.0 10e3/uL    RBC Count 4.79 3.80 - 5.20 10e6/uL    Hemoglobin 14.5 11.7 - 15.7 g/dL    Hematocrit 44.3 35.0 - 47.0 %    MCV 93 78 - 100 fL    MCH 30.3 26.5 - 33.0 pg    MCHC 32.7 31.5 -  36.5 g/dL    RDW 12.8 10.0 - 15.0 %    Platelet Count 263 150 - 450 10e3/uL    % Neutrophils 58 %    % Lymphocytes 31 %    % Monocytes 10 %    % Eosinophils 1 %    % Basophils 0 %    % Immature Granulocytes 0 %    NRBCs per 100 WBC 0 <1 /100    Absolute Neutrophils 5.0 1.6 - 8.3 10e3/uL    Absolute Lymphocytes 2.7 0.8 - 5.3 10e3/uL    Absolute Monocytes 0.8 0.0 - 1.3 10e3/uL    Absolute Eosinophils 0.1 0.0 - 0.7 10e3/uL    Absolute Basophils 0.0 0.0 - 0.2 10e3/uL    Absolute Immature Granulocytes 0.0 <=0.4 10e3/uL    Absolute NRBCs 0.0 10e3/uL   Head CT w/o contrast    Narrative    EXAM: CT HEAD W/O CONTRAST  LOCATION: [facility]  DATE/TIME: 5/2/2023 8:28 PM CDT    INDICATION: vertigo  COMPARISON: None.  TECHNIQUE: Routine CT Head without IV contrast. Multiplanar reformats. Dose reduction techniques were used.    FINDINGS:  INTRACRANIAL CONTENTS: No intracranial hemorrhage, extraaxial collection, or mass effect.  No CT evidence of acute infarct. Normal parenchymal attenuation. Normal ventricles and sulci.     VISUALIZED ORBITS/SINUSES/MASTOIDS: No intraorbital abnormality. No paranasal sinus mucosal disease. No middle ear or mastoid effusion.    BONES/SOFT TISSUES: No acute abnormality.      Impression    IMPRESSION:  1.  No acute intracranial process.       Medications   0.9% sodium chloride BOLUS (1,000 mLs Intravenous $New Bag 5/2/23 2002)     Followed by   sodium chloride 0.9% infusion (has no administration in time range)   ondansetron (ZOFRAN) injection 4 mg (4 mg Intravenous $Given 5/2/23 2004)   meclizine (ANTIVERT) tablet 25 mg (25 mg Oral $Given 5/2/23 2003)       Assessments & Plan (with Medical Decision Making)  62-year-old with vertiginous symptoms.  I think this is most likely peripheral vertigo given it is provoked by movement and better with rest and has a couple beats of nystagmus.  It goes away altogether.  She is not favoring 1 side or the other but rather feels off  balance and can get better with staring at one spot on the wall.  She is able to stand.  Blood pressure does not drop and heart rate does not go up with standing up.  I did check that separately.  Labs were performed.  IV fluids and IV Zofran and oral meclizine given.  She does not take well to Valium so that was not given.  CT scan of the head was performed and the results were reviewed.  EKG is unremarkable.  She is in a sinus rhythm on the monitor consistently.  Initially her blood pressure was high and her pulse was high which may be as result of not feeling well, vertigo and whitecoat syndrome.  At no time did her tracing look a regular or was there evidence for any heart blocks or atrial fibrillation or atrial flutter.  No history of that.  No chest pain to suggest PE or pneumonia or acute coronary syndrome.  CT scan shows no acute findings.  Patient was reevaluated and was feeling somewhat better.  She could walk in the hallway.  Symptoms are still present with movement and better with rest.  No headache.  I explained that this is most likely BPPV.  The differential diagnosis, treatment options, risks and follow-up discussed with a competent patient who agrees with the plan.  She was given Zofran ODT from the [employer] machine.  She was given a prescription for meclizine sent to her pharmacy.  Patient was discharged home in improved condition.     I have reviewed the nursing notes.    I have reviewed the findings, diagnosis, plan and need for follow up with the patient.          Medical Decision Making  The patient's presentation was of moderate complexity (an acute illness with systemic symptoms).    The patient's evaluation involved:  ordering and/or review of 3+ test(s) in this encounter (see separate area of note for details)    The patient's management necessitated moderate risk (prescription drug management including medications given in the ED).        New Prescriptions    MECLIZINE (ANTIVERT) 25 MG  TABLET    Take 1 tablet (25 mg) by mouth 3 times daily as needed for dizziness    ONDANSETRON (ZOFRAN ODT) 4 MG ODT TAB    Take 1 tablet (4 mg) by mouth every 8 hours as needed for nausea       Final diagnoses:   Vertigo       5/2/2023   [facility] EMERGENCY DEPT     Matthieu Luna MD  05/02/23 [id number]

## 2023-05-03 NOTE — DISCHARGE INSTRUCTIONS
Please return to the ER if new or worsening symptoms for re-evaluation. I hope you get better quickly.   Take the meclizine for vertigo and zofran for your symptoms. If no improvement follow up with your doctor. Sometimes physical therapy is useful for this.

## 2023-05-03 NOTE — ED TRIAGE NOTES
"Pt presents with dizziness and tachycardia. Pt states had episode on Sunday that lasted two hours. Pt states this episode started at 1300. Pt had to lay down. Nausea. Pt 's. Regular. No one sided weakness. Smile normal. Pt alert and orientated. Good hand grasps.      Triage Assessment     Row Name 05/02/23 1922       Triage Assessment (Adult)    Airway WDL WDL       Respiratory WDL    Respiratory WDL WDL       Skin Circulation/Temperature WDL    Skin Circulation/Temperature WDL WDL       Cardiac WDL    Cardiac WDL X  Tachycardia . Regular.       Peripheral/Neurovascular WDL    Peripheral Neurovascular WDL WDL       Cognitive/Neuro/Behavioral WDL    Cognitive/Neuro/Behavioral WDL X  Pt feels \"off balance\" Started on Sunday.       Ignacio Coma Scale    Best Eye Response 4-->(E4) spontaneous    Best Motor Response 6-->(M6) obeys commands    Best Verbal Response 5-->(V5) oriented    Ignacio Coma Scale Score 15              "

## 2023-06-13 ENCOUNTER — LAB (OUTPATIENT)
Dept: LAB | Facility: CLINIC | Age: 63
End: 2023-06-13
Payer: COMMERCIAL

## 2023-06-13 DIAGNOSIS — Z12.11 SPECIAL SCREENING FOR MALIGNANT NEOPLASMS, COLON: Primary | ICD-10-CM

## 2023-06-13 PROCEDURE — 82274 ASSAY TEST FOR BLOOD FECAL: CPT | Performed by: INTERNAL MEDICINE

## 2023-06-14 LAB — HEMOCCULT STL QL IA: POSITIVE

## 2023-07-03 ENCOUNTER — HOSPITAL ENCOUNTER (OUTPATIENT)
Dept: MAMMOGRAPHY | Facility: CLINIC | Age: 63
Discharge: HOME | End: 2023-07-03
Attending: FAMILY MEDICINE | Admitting: FAMILY MEDICINE
Payer: COMMERCIAL

## 2023-07-03 DIAGNOSIS — Z12.31 VISIT FOR SCREENING MAMMOGRAM: ICD-10-CM

## 2023-07-03 PROCEDURE — 77067 SCR MAMMO BI INCL CAD: CPT

## 2023-07-17 ASSESSMENT — ENCOUNTER SYMPTOMS
NERVOUS/ANXIOUS: 0
JOINT SWELLING: 0
COUGH: 0
DYSURIA: 0
WEAKNESS: 0
CONSTIPATION: 0
MYALGIAS: 0
SORE THROAT: 0
HEMATOCHEZIA: 0
DIARRHEA: 0
EYE PAIN: 0
ABDOMINAL PAIN: 0
HEMATURIA: 0
PARESTHESIAS: 0
PALPITATIONS: 0
FREQUENCY: 0
ARTHRALGIAS: 0
HEADACHES: 0
HEARTBURN: 0
BREAST MASS: 0
FEVER: 0
CHILLS: 0
NAUSEA: 0
SHORTNESS OF BREATH: 0
DIZZINESS: 0

## 2023-07-19 ENCOUNTER — OFFICE VISIT (OUTPATIENT)
Dept: FAMILY MEDICINE | Facility: CLINIC | Age: 63
End: 2023-07-19
Payer: COMMERCIAL

## 2023-07-19 VITALS
RESPIRATION RATE: 16 BRPM | HEART RATE: 68 BPM | BODY MASS INDEX: 24.91 KG/M2 | TEMPERATURE: 97.3 F | SYSTOLIC BLOOD PRESSURE: 110 MMHG | HEIGHT: 66 IN | WEIGHT: 155 LBS | OXYGEN SATURATION: 97 % | DIASTOLIC BLOOD PRESSURE: 60 MMHG

## 2023-07-19 DIAGNOSIS — Z00.00 ROUTINE GENERAL MEDICAL EXAMINATION AT A HEALTH CARE FACILITY: Primary | ICD-10-CM

## 2023-07-19 DIAGNOSIS — E78.5 HYPERLIPIDEMIA LDL GOAL <130: ICD-10-CM

## 2023-07-19 LAB
CHOLEST SERPL-MCNC: 237 MG/DL
FASTING STATUS PATIENT QL REPORTED: YES
GLUCOSE SERPL-MCNC: 100 MG/DL (ref 70–99)
HDLC SERPL-MCNC: 61 MG/DL
LDLC SERPL CALC-MCNC: 128 MG/DL
NONHDLC SERPL-MCNC: 176 MG/DL
TRIGL SERPL-MCNC: 242 MG/DL
TSH SERPL DL<=0.005 MIU/L-ACNC: 1.9 UIU/ML (ref 0.3–4.2)

## 2023-07-19 PROCEDURE — 80061 LIPID PANEL: CPT | Performed by: NURSE PRACTITIONER

## 2023-07-19 PROCEDURE — 99396 PREV VISIT EST AGE 40-64: CPT | Performed by: NURSE PRACTITIONER

## 2023-07-19 PROCEDURE — 36415 COLL VENOUS BLD VENIPUNCTURE: CPT | Performed by: NURSE PRACTITIONER

## 2023-07-19 PROCEDURE — 84443 ASSAY THYROID STIM HORMONE: CPT | Performed by: NURSE PRACTITIONER

## 2023-07-19 PROCEDURE — 82947 ASSAY GLUCOSE BLOOD QUANT: CPT | Performed by: NURSE PRACTITIONER

## 2023-07-19 ASSESSMENT — ENCOUNTER SYMPTOMS
HEARTBURN: 0
DYSURIA: 0
HEMATURIA: 0
COUGH: 0
HEADACHES: 0
CONSTIPATION: 0
JOINT SWELLING: 0
SHORTNESS OF BREATH: 0
NERVOUS/ANXIOUS: 0
FREQUENCY: 0
DIZZINESS: 0
ABDOMINAL PAIN: 0
HEMATOCHEZIA: 0
WEAKNESS: 0
PALPITATIONS: 0
DIARRHEA: 0
BREAST MASS: 0
EYE PAIN: 0
CHILLS: 0
MYALGIAS: 0
ARTHRALGIAS: 0
FEVER: 0
PARESTHESIAS: 0
SORE THROAT: 0
NAUSEA: 0

## 2023-07-19 ASSESSMENT — PAIN SCALES - GENERAL: PAINLEVEL: NO PAIN (0)

## 2023-07-19 NOTE — PROGRESS NOTES
SUBJECTIVE:   CC: Diann is an 63 year old who presents for preventive health visit.       2023     8:09 AM   Additional Questions   Roomed by Keily     Healthy Habits:     Getting at least 3 servings of Calcium per day:  Yes    Bi-annual eye exam:  Yes    Dental care twice a year:  Yes    Sleep apnea or symptoms of sleep apnea:  None    Diet:  Regular (no restrictions)    Frequency of exercise:  4-5 days/week    Duration of exercise:  15-30 minutes    Taking medications regularly:  Yes    Medication side effects:  Not applicable    Additional concerns today:  No      Today's PHQ-2 Score:       2023     9:29 AM   PHQ-2 (  [employer])   Q1: Little interest or pleasure in doing things 0   Q2: Feeling down, depressed or hopeless 0   PHQ-2 Score 0   Q1: Little interest or pleasure in doing things Not at all   Q2: Feeling down, depressed or hopeless Not at all   PHQ-2 Score 0       Social History     Tobacco Use     Smoking status: Former     Types: Cigarettes     Quit date: 1987     Years since quittin.5     Smokeless tobacco: Never   Substance Use Topics     Alcohol use: Yes     Comment: 1-2 uses/year           2023     6:47 AM   Alcohol Use   Prescreen: >3 drinks/day or >7 drinks/week? Not Applicable     Reviewed orders with patient.  Reviewed health maintenance and updated orders accordingly - Yes  Labs reviewed in EPIC    Breast Cancer Screenin/10/2021    10:13 AM   Breast CA Risk Assessment (FHS-7)   Do you have a family history of breast, colon, or ovarian cancer? No / Unknown       Pertinent mammograms are reviewed under the imaging tab.    History of abnormal Pap smear: NO - age 30-65 PAP every 5 years with negative HPV co-testing recommended      Latest Ref Rng & Units 2019     8:31 AM 2016    12:00 AM 2012     9:00 AM   PAP / HPV   PAP (Historical)  NIL  NIL  NIL    HPV 16 DNA NEG^Negative Negative      HPV 18 DNA NEG^Negative Negative      Other HR HPV  "NEG^Negative Negative        Reviewed and updated as needed this visit by clinical staff   Tobacco   Meds              Reviewed and updated as needed this visit by Provider                   Review of Systems   Constitutional: Negative for chills and fever.   HENT: Negative for congestion, ear pain, hearing loss and sore throat.    Eyes: Negative for pain and visual disturbance.   Respiratory: Negative for cough and shortness of breath.    Cardiovascular: Negative for chest pain, palpitations and peripheral edema.   Gastrointestinal: Negative for abdominal pain, constipation, diarrhea, heartburn, hematochezia and nausea.   Breasts:  Negative for tenderness, breast mass and discharge.   Genitourinary: Negative for dysuria, frequency, genital sores, hematuria, pelvic pain, urgency, vaginal bleeding and vaginal discharge.   Musculoskeletal: Negative for arthralgias, joint swelling and myalgias.   Skin: Negative for rash.   Neurological: Negative for dizziness, weakness, headaches and paresthesias.   Psychiatric/Behavioral: Negative for mood changes. The patient is not nervous/anxious.       OBJECTIVE:   /60 (Cuff Size: Adult Regular)   Pulse 68   Temp 97.3  F (36.3  C) (Tympanic)   Resp 16   Ht 1.676 m (5' 6\")   Wt 70.3 kg (155 lb)   LMP 12/04/2016   SpO2 97%   BMI 25.02 kg/m    Physical Exam  GENERAL: healthy, alert and no distress  EYES: Eyes grossly normal to inspection, PERRL and conjunctivae and sclerae normal  HENT: ear canals and TM's normal, nose and mouth without ulcers or lesions  NECK: no adenopathy, no asymmetry, masses, or scars and thyroid normal to palpation  RESP: lungs clear to auscultation - no rales, rhonchi or wheezes  BREAST: declined  CV: regular rate and rhythm, normal S1 S2, no S3 or S4, no murmur, click or rub, no peripheral edema and peripheral pulses strong  ABDOMEN: soft, nontender, no hepatosplenomegaly, no masses and bowel sounds normal  MS: no gross musculoskeletal defects " noted, no edema  SKIN: no suspicious lesions or rashes  NEURO: Normal strength and tone, mentation intact and speech normal  PSYCH: mentation appears normal, affect normal/bright    Diagnostic Test Results:  No results found for any visits on 07/19/23.    ASSESSMENT/PLAN:   (Z00.00) Routine general medical examination at a health care facility  (primary encounter diagnosis)  Comment: preventative labs pending.  Positive FIT -reports hemorrhoid and a little constipation prior to collection, declined colonoscopy at this time.  Plan: Glucose, TSH with free T4 reflex          (E78.5) Hyperlipidemia LDL goal <130  Comment: labs pending for monitoring  Plan: Lipid panel reflex to direct LDL Fasting           COUNSELING:  Reviewed preventive health counseling, as reflected in patient instructions        She reports that she quit smoking about 36 years ago. Her smoking use included cigarettes. She has never used smokeless tobacco.      GERARD [facility]

## 2023-10-22 ENCOUNTER — APPOINTMENT (OUTPATIENT)
Dept: CT IMAGING | Facility: CLINIC | Age: 63
End: 2023-10-22
Attending: EMERGENCY MEDICINE
Payer: COMMERCIAL

## 2023-10-22 ENCOUNTER — HOSPITAL ENCOUNTER (EMERGENCY)
Facility: CLINIC | Age: 63
Discharge: HOME | End: 2023-10-22
Attending: EMERGENCY MEDICINE | Admitting: EMERGENCY MEDICINE
Payer: COMMERCIAL

## 2023-10-22 VITALS
BODY MASS INDEX: 24.21 KG/M2 | OXYGEN SATURATION: 96 % | DIASTOLIC BLOOD PRESSURE: 68 MMHG | RESPIRATION RATE: 18 BRPM | TEMPERATURE: 98 F | WEIGHT: 150 LBS | SYSTOLIC BLOOD PRESSURE: 116 MMHG | HEART RATE: 67 BPM

## 2023-10-22 DIAGNOSIS — S22.080A T12 COMPRESSION FRACTURE, INITIAL ENCOUNTER (H): ICD-10-CM

## 2023-10-22 DIAGNOSIS — M62.838 MUSCLE SPASM: ICD-10-CM

## 2023-10-22 DIAGNOSIS — S39.92XA INJURY OF LOW BACK, INITIAL ENCOUNTER: ICD-10-CM

## 2023-10-22 PROCEDURE — 250N000011 HC RX IP 250 OP 636: Performed by: EMERGENCY MEDICINE

## 2023-10-22 PROCEDURE — 96372 THER/PROPH/DIAG INJ SC/IM: CPT | Performed by: EMERGENCY MEDICINE

## 2023-10-22 PROCEDURE — 99284 EMERGENCY DEPT VISIT MOD MDM: CPT | Performed by: EMERGENCY MEDICINE

## 2023-10-22 PROCEDURE — 99285 EMERGENCY DEPT VISIT HI MDM: CPT | Mod: 25 | Performed by: EMERGENCY MEDICINE

## 2023-10-22 PROCEDURE — 72131 CT LUMBAR SPINE W/O DYE: CPT

## 2023-10-22 RX ORDER — LORAZEPAM 2 MG/ML
1 INJECTION INTRAMUSCULAR ONCE
Status: COMPLETED | OUTPATIENT
Start: 2023-10-22 | End: 2023-10-22

## 2023-10-22 RX ORDER — OXYCODONE HYDROCHLORIDE 5 MG/1
5 TABLET ORAL EVERY 6 HOURS PRN
Qty: 12 TABLET | Refills: 0 | Status: SHIPPED | OUTPATIENT
Start: 2023-10-22 | End: 2024-01-29

## 2023-10-22 RX ORDER — CYCLOBENZAPRINE HCL 10 MG
10 TABLET ORAL 3 TIMES DAILY PRN
Qty: 20 TABLET | Refills: 0 | Status: SHIPPED | OUTPATIENT
Start: 2023-10-22 | End: 2023-10-28

## 2023-10-22 RX ADMIN — HYDROMORPHONE HYDROCHLORIDE 1 MG: 1 INJECTION, SOLUTION INTRAMUSCULAR; INTRAVENOUS; SUBCUTANEOUS at 10:43

## 2023-10-22 RX ADMIN — LORAZEPAM 1 MG: 2 INJECTION INTRAMUSCULAR; INTRAVENOUS at 10:43

## 2023-10-22 ASSESSMENT — ACTIVITIES OF DAILY LIVING (ADL): ADLS_ACUITY_SCORE: 35

## 2023-10-22 NOTE — DISCHARGE INSTRUCTIONS
As discussed you have a T12 compression fracture of 25%. I put in a referral to neurosurgery.  Wear a back brace, activity as tolerated.  Please return to the ER if new or worsening symptoms for re-evaluation. I hope you get better quickly.   Use caution when taking pain pills and muscle relaxers.  They can make you dizzy, constipated, off balance.  This will take time to get better and slowly the muscles were released himself.  Slowly increase range of motion with pelvic tilt exercises as discussed.  He will take quite some time before you can achieve full range of motion again.  At least a CT scan was reassuring that there were no fractures.  Activity as tolerated.  It was a pleasure to see you today.  I am sorry this happened to you.

## 2023-10-22 NOTE — ED TRIAGE NOTES
Pt reports she was getting off her horse yesterday and her foot got caught in the stirrup and she fell onto her back, she landed on dirt surface, she is reporting a lot of low back pain that is worse to the left side

## 2023-10-22 NOTE — ED PROVIDER NOTES
History     Chief Complaint   Patient presents with    Back Pain     HPI  Diann Altman is a 63 year old female who presents to the ER secondary to a low back injury after falling off a horse yesterday morning.  She was getting off of her horse and her foot got caught in the stirrup and she twisted and she was falling to the ground.  She landed on her back.  She did not sustain significant head injury.  No extremity injury chest injury or abdominal injury.  She does not have any developing abdominal pain or radicular symptoms down the legs.  Her pain is located in the left paraspinal area of the lumbar area diffusely.  No midline spine pain.  It hurts to twist and turn and bend at the waist.  It was difficult to get into the house after the fall.  She did take ibuprofen at home and did have 1 oxycodone at home that she took which helped her sleep until 4.  It was difficult to get out of bed this morning.  She can walk but it is difficult.    Allergies:  Allergies   Allergen Reactions    Clindamycin Hives       Problem List:    Patient Active Problem List    Diagnosis Date Noted    Counseling regarding advanced directives 09/08/2016     Priority: Medium    HYPERLIPIDEMIA LDL GOAL <130 10/31/2010     Priority: Medium        Past Medical History:    Past Medical History:   Diagnosis Date    AC (acromioclavicular) joint arthritis     NO ACTIVE PROBLEMS        Past Surgical History:    Past Surgical History:   Procedure Laterality Date    EYE SURGERY  as child    bilat.       Family History:    Family History   Problem Relation Age of Onset    Heart Disease Father     Lipids Father     Diabetes Maternal Grandmother     Eye Disorder Maternal Grandmother         cataract    Heart Disease Maternal Grandmother     Arthritis Maternal Grandmother     Hypertension Maternal Grandmother     Hypertension Sister     Lipids Sister     Gynecology Sister         pelvic inflam disease    Allergies Mother     Respiratory Mother          emphysema    Heart Disease Mother         COPD    Arthritis Paternal Grandmother     Eye Disorder Paternal Grandmother         cataract    Gastrointestinal Disease Paternal Grandmother         diverticulitis    Lipids Paternal Grandfather     Diabetes Paternal Grandfather     Heart Disease Maternal Grandfather     Cardiovascular Maternal Grandfather         MI    No Known Problems Son        Social History:  Marital Status:   [2]  Social History     Tobacco Use    Smoking status: Former     Types: Cigarettes     Quit date: 1987     Years since quittin.8    Smokeless tobacco: Never   Vaping Use    Vaping Use: Never used   Substance Use Topics    Alcohol use: Yes     Comment: 1-2 uses/year    Drug use: No        Medications:    Multiple Vitamins-Minerals (WOMENS 50+ MULTI VITAMIN PO)          Review of Systems   All other systems reviewed and are negative.      Physical Exam   BP: 129/76  Pulse: 88  Temp: 98  F (36.7  C)  Resp: 18  Weight: 68 kg (150 lb)  SpO2: 98 %      Physical Exam  Vitals and nursing note reviewed.   Constitutional:       General: She is not in acute distress.     Appearance: Normal appearance. She is well-developed.   HENT:      Head: Normocephalic and atraumatic.      Right Ear: External ear normal.      Left Ear: External ear normal.      Nose: Nose normal.      Mouth/Throat:      Mouth: Mucous membranes are moist.   Eyes:      General: No scleral icterus.     Extraocular Movements: Extraocular movements intact.      Conjunctiva/sclera: Conjunctivae normal.      Pupils: Pupils are equal, round, and reactive to light.   Cardiovascular:      Rate and Rhythm: Normal rate.   Pulmonary:      Effort: Pulmonary effort is normal. No respiratory distress.   Abdominal:      General: Abdomen is flat.   Musculoskeletal:      Cervical back: Normal range of motion and neck supple.      Comments: Palpable muscle spasm over the left paraspinal lumbar musculature.  No midline tenderness or  swelling.  Reduced range of motion at the waist.  Patient is preferring to stand.  Sitting makes it worse.  She does not get any radicular symptoms down the legs.   Skin:     General: Skin is warm and dry.      Findings: No rash.   Neurological:      General: No focal deficit present.      Mental Status: She is alert and oriented to person, place, and time.   Psychiatric:         Mood and Affect: Mood normal.         ED Course                 Procedures                  Results for orders placed or performed during the hospital encounter of 10/22/23 (from the past 24 hour(s))   Lumbar spine CT w/o contrast    Narrative    EXAM: CT LUMBAR SPINE W/O CONTRAST  [facility]  DATE: 10/22/2023    INDICATION: low back injury fall from horse; Low back pain; Trauma and or suspected fracture; Significant trauma; No known automatically detected potential contraindications to CT  COMPARISON: None.  TECHNIQUE: Routine CT Lumbar Spine without IV contrast. Multiplanar reformats. Dose reduction techniques were used.     FINDINGS:  Acute superior plate T12 compression fracture with 25% vertebral body height loss. No other evidence of acute fracture or subluxation of the lumbar spine by CT imaging. Anterior marginal osteophytes at T12/L1 - L4/L5. The partially imaged intracranial   contents are unremarkable. No prevertebral soft tissue swelling. Colonic diverticulosis without evidence of diverticulitis.    T12/L1:  No posterior disc bulge or spinal canal narrowing. No neural foraminal narrowing.    L1/L2:  No posterior disc bulge or spinal canal narrowing. No neural foraminal narrowing.    L2/L3:  No posterior disc bulge or spinal canal narrowing. No neural foraminal narrowing.    L3/L4:  No posterior disc bulge or spinal canal narrowing. No neural foraminal narrowing.      L4/L5:  Symmetric disc bulge and mild facet arthropathy without significant spinal canal narrowing. No neural foraminal  narrowing.     L5/S1: Symmetric disc bulge and moderate facet arthropathy without significant spinal canal narrowing. Mild bilateral neural from narrowing.       Impression    IMPRESSION:  1.  Acute superior endplate T12 compression fracture with 25% vertebral body height loss  2.  No other evidence of acute fracture or subluxation of the lumbar spine by CT imaging.       Medications   HYDROmorphone (DILAUDID) injection 1 mg (1 mg Intramuscular $Given 10/22/23 1043)   LORazepam (ATIVAN) injection 1 mg (1 mg Intramuscular $Given 10/22/23 1043)       Assessments & Plan (with Medical Decision Making)  Lumbar back injury after twisting fall off of a horse.  Examination would suggest that this is a muscular injury given no midline lumbar spine tenderness and palpable paraspinal muscle spasm.  After discussion of options we decided proceed with CT scan lumbar spine, IM Ativan and Dilaudid.  Reevaluation: Patient was improved with the IM medications.  CT shows compression fracture, 25% at T12.  Patient has no neurologic symptoms such as radiculopathy or incontinence or numbness.  I reviewed the results of the CT scan with the patient.  We discussed the diagnosis, treatment options, risks and follow-up with a competent patient and her  who agrees with the plan.  Gentle range of motion exercises were discussed.  Referral to neurosurgery, back brace, consider TLSO with neurosurgery input, oxycodone, flexeril. All questions answered prior to discharge.     I have reviewed the nursing notes.    I have reviewed the findings, diagnosis, plan and need for follow up with the patient.          New Prescriptions    No medications on file       Final diagnoses:   Injury of low back, initial encounter   Muscle spasm       10/22/2023   [facility] EMERGENCY DEPT       Matthieu Luna MD  10/22/23 7192

## 2023-10-25 ENCOUNTER — TELEPHONE (OUTPATIENT)
Dept: NEUROSURGERY | Facility: CLINIC | Age: 63
End: 2023-10-25
Payer: COMMERCIAL

## 2023-10-25 NOTE — TELEPHONE ENCOUNTER
Patient was incorrectly scheduled with Dr. López for T12 compression fracture. After chart review, it has been determined that patient needs to be rescheduled with an JUDE within 1-2 weeks.    Called patient who understands that she will not have an appointment with Dr. López tomorrow. I am working on rescheduling her and will do so when I find a solution.    Lizbet Hirsch RN on 10/25/2023 at 10:41 AM

## 2023-10-26 ENCOUNTER — PRE VISIT (OUTPATIENT)
Dept: NEUROSURGERY | Facility: CLINIC | Age: 63
End: 2023-10-26

## 2023-10-26 NOTE — CONFIDENTIAL NOTE
NEUROSURGERY- NEW PREVISIT PLANNING       Record Status/Location     Referring Provider Referral Matthieu Luna MD    Diagnosis Referral S22.080A (ICD-10-CM) - T12 compression fracture, initial encounter (H)    MRI (HEAD, NECK, SPINE) Pacs    CT Pacs Lumbar 10/22/23 [facility]    X-ray Pacs Pelvis 6/21/22 [facility] [place]    INJECTION Na    PHYSICAL THERAPY Na    SURGERY Na

## 2023-11-02 ENCOUNTER — OFFICE VISIT (OUTPATIENT)
Dept: NEUROSURGERY | Facility: CLINIC | Age: 63
End: 2023-11-02
Attending: EMERGENCY MEDICINE
Payer: COMMERCIAL

## 2023-11-02 VITALS
RESPIRATION RATE: 18 BRPM | SYSTOLIC BLOOD PRESSURE: 132 MMHG | WEIGHT: 156 LBS | OXYGEN SATURATION: 99 % | HEART RATE: 72 BPM | BODY MASS INDEX: 25.18 KG/M2 | TEMPERATURE: 97.2 F | DIASTOLIC BLOOD PRESSURE: 80 MMHG

## 2023-11-02 DIAGNOSIS — S22.080A T12 COMPRESSION FRACTURE, INITIAL ENCOUNTER (H): ICD-10-CM

## 2023-11-02 PROCEDURE — 99243 OFF/OP CNSLTJ NEW/EST LOW 30: CPT | Performed by: NURSE PRACTITIONER

## 2023-11-02 RX ORDER — METHOCARBAMOL 750 MG/1
750 TABLET, FILM COATED ORAL 4 TIMES DAILY PRN
Qty: 30 TABLET | Refills: 0 | Status: SHIPPED | OUTPATIENT
Start: 2023-11-02 | End: 2024-01-29

## 2023-11-02 ASSESSMENT — PAIN SCALES - GENERAL: PAINLEVEL: SEVERE PAIN (6)

## 2023-11-02 NOTE — LETTER
2023         RE: Diann Altman  [address] [facility] [id number]        Dear Colleague,    Thank you for referring your patient, Diann Altman, to the [facility] NEUROSURGERY [facility] [place]. Please see a copy of my visit note below.    [facility]  Neurosurgery Clinic Visit      CC: back pain    Primary care Provider: Nataly Gar    Reason For Visit:   I was asked by Dr Matthieu Luna to consult on the patient for T12 compression fracture.    HPI: Diann Altman is a 63 year old female with a fall off a horse 10/21/2023. She presented to the ED for evaluation the following day and was found to have a T12 compression fracture. She presents for evaluation. Today she reports significant improvement in her symptoms. She reports midline low back pain. She denies any radicular leg pain, paresthesias, or overt weakness. No bowel/bladder complaints or foot drop. She reports pain is worse with bending and twisting.    Past Medical History:   Diagnosis Date     AC (acromioclavicular) joint arthritis      NO ACTIVE PROBLEMS        Past Medical History reviewed with patient during visit.    Past Surgical History:   Procedure Laterality Date     EYE SURGERY  as child    bilat.     Past Surgical History reviewed with patient during visit.    Current Outpatient Medications   Medication     methocarbamol (ROBAXIN) 750 MG tablet     Multiple Vitamins-Minerals (WOMENS 50+ MULTI VITAMIN PO)     oxyCODONE (ROXICODONE) 5 MG tablet     No current facility-administered medications for this visit.       Allergies   Allergen Reactions     Clindamycin Hives       Social History     Socioeconomic History     Marital status:      Spouse name: Matt     Number of children: 2     Years of education: None     Highest education level: None   Tobacco Use     Smoking status: Former     Types: Cigarettes     Quit date: 1987     Years since quittin.8     Smokeless tobacco: Never   Vaping Use      Vaping Use: Never used   Substance and Sexual Activity     Alcohol use: Yes     Comment: 1-2 uses/year     Drug use: No     Sexual activity: Yes     Partners: Male     Birth control/protection: Female Surgical     Comment: [employer]   Other Topics Concern     Parent/sibling w/ CABG, MI or angioplasty before 65F 55M? No      Service No     Blood Transfusions No     Caffeine Concern No     Occupational Exposure No     Hobby Hazards No     Sleep Concern No     Stress Concern No     Weight Concern No     Special Diet No     Back Care No     Exercise Yes     Comment: exercise 6 times aweek     Bike Helmet No     Seat Belt Yes     Self-Exams Yes       Family History   Problem Relation Age of Onset     Heart Disease Father      Lipids Father      Diabetes Maternal Grandmother      Eye Disorder Maternal Grandmother         cataract     Heart Disease Maternal Grandmother      Arthritis Maternal Grandmother      Hypertension Maternal Grandmother      Hypertension Sister      Lipids Sister      Gynecology Sister         pelvic inflam disease     Allergies Mother      Respiratory Mother         emphysema     Heart Disease Mother         COPD     Arthritis Paternal Grandmother      Eye Disorder Paternal Grandmother         cataract     Gastrointestinal Disease Paternal Grandmother         diverticulitis     Lipids Paternal Grandfather      Diabetes Paternal Grandfather      Heart Disease Maternal Grandfather      Cardiovascular Maternal Grandfather         MI     No Known Problems Son          ROS: 10 point ROS neg other than the symptoms noted above in the HPI.    Vital Signs:   /80   Pulse 72   Temp 97.2  F (36.2  C) (Temporal)   Resp 18   Wt 156 lb (70.8 kg)   LMP 12/04/2016   SpO2 99%   BMI 25.18 kg/m        Examination:  Constitutional:  Alert, well nourished, NAD.  Memory: recent and remote memory   HEENT: Normocephalic, atraumatic.   Pulm:  Without shortness of breath   CV:  No pitting edema of BLE.       Neurological:  Awake  Alert  Oriented x 3  Speech clear    Motor exam:   Hip Flexion:                 Right: 5/5  Left:  5/5  Hip Abduction:             Right:  5/5  Left:  5/5  Hip Adduction:             Right:  5/5  Left:  5/5  Plantar Flexion:           Right:  5/5  Left:  5/5  Dorsal Flexion:            Right:  5/5  Left:  5/5  EHL:                            Right:  5/5  Left:  5/5     Sensation normal to bilateral upper and lower extremities  Muscle tone to bilateral upper and lower extremities   Gait: Able to stand from a seated position. Normal non-antalgic, non-myelopathic gait.  Able to heel/toe walk without loss of balance    Lumbar examination reveals mild tenderness of the spine.  Hip height is symmetrical. Negative SI joint, sciatic notch or greater trochanteric tenderness to palpation bilaterally.  Straight leg raise is negative bilaterally.      Imaging:   EXAM: CT LUMBAR SPINE W/O CONTRAST  LOCATION: [facility]  DATE: 10/22/2023     INDICATION: low back injury fall from horse; Low back pain; Trauma and or suspected fracture; Significant trauma; No known automatically detected potential contraindications to CT  COMPARISON: None.  TECHNIQUE: Routine CT Lumbar Spine without IV contrast. Multiplanar reformats. Dose reduction techniques were used.      FINDINGS:  Acute superior plate T12 compression fracture with 25% vertebral body height loss. No other evidence of acute fracture or subluxation of the lumbar spine by CT imaging. Anterior marginal osteophytes at T12/L1 - L4/L5. The partially imaged intracranial   contents are unremarkable. No prevertebral soft tissue swelling. Colonic diverticulosis without evidence of diverticulitis.     T12/L1:  No posterior disc bulge or spinal canal narrowing. No neural foraminal narrowing.     L1/L2:  No posterior disc bulge or spinal canal narrowing. No neural foraminal narrowing.     L2/L3:  No posterior disc bulge or spinal  canal narrowing. No neural foraminal narrowing.     L3/L4:  No posterior disc bulge or spinal canal narrowing. No neural foraminal narrowing.       L4/L5:  Symmetric disc bulge and mild facet arthropathy without significant spinal canal narrowing. No neural foraminal narrowing.      L5/S1: Symmetric disc bulge and moderate facet arthropathy without significant spinal canal narrowing. Mild bilateral neural from narrowing.                                                                       IMPRESSION:  1.  Acute superior endplate T12 compression fracture with 25% vertebral body height loss  2.  No other evidence of acute fracture or subluxation of the lumbar spine by CT imaging.    Assessment/Plan:   Thoracic 12 compression fracture    Will obtain spinal brace which should be worn when out of bed. Orthotics to fit her for the brace today. She should avoid bending, twisting, and lifting. Robaxin sent to her pharmacy for back spasms. Follow up in 6 weeks with xray prior. She verbalized understanding and agreement.    Patient Instructions   -Orthotics consult for spinal brace. Brace to be worn when out of bed. Ok to have off if lying or sitting down.  -Avoid bending and twisting.  -Robaxin (muscle relaxer) sent to your pharmacy. Please take sparingly.  -Follow up in clinic in 6 weeks with xray prior. You may schedule this at the  or by contacting our office.   -Please contact our clinic with questions or concerns at [phone].    Susan Parks CNP  [facility]  [address] [place] [id number]  Tel [phone]  Fax [phone]      Again, thank you for allowing me to participate in the care of your patient.        Sincerely,        Susan Parks, KEHINDE

## 2023-11-02 NOTE — PATIENT INSTRUCTIONS
-Orthotics consult for spinal brace. Brace to be worn when out of bed. Ok to have off if lying or sitting down.  -Avoid bending and twisting.  -Robaxin (muscle relaxer) sent to your pharmacy. Please take sparingly.  -Follow up in clinic in 6 weeks with xray prior. You may schedule this at the  or by contacting our office.   -Please contact our clinic with questions or concerns at [phone].

## 2023-11-02 NOTE — NURSING NOTE
"Diann Altman is a 63 year old female who presents for:  Chief Complaint   Patient presents with    Neurologic Problem     T12 compression [id number] compression fracture DOS:10/21/2023        Initial Vitals:  /80   Pulse 72   Temp 97.2  F (36.2  C) (Temporal)   Resp 18   Wt 156 lb (70.8 kg)   LMP 12/04/2016   SpO2 99%   BMI 25.18 kg/m   Estimated body mass index is 25.18 kg/m  as calculated from the following:    Height as of 7/19/23: 5' 6\" (1.676 m).    Weight as of this encounter: 156 lb (70.8 kg).. Body surface area is 1.82 meters squared. BP completed using cuff size: regular  Severe Pain (6)    Nursing Comments:     Eden Mckinney    "

## 2023-11-02 NOTE — PROGRESS NOTES
[facility] Neurosurgery  Neurosurgery Clinic Visit      CC: back pain    Primary care Provider: Nataly Gar    Reason For Visit:   I was asked by Dr Matthieu Luna to consult on the patient for T12 compression fracture.    HPI: Diann Altman is a 63 year old female with a fall off a horse 10/21/2023. She presented to the ED for evaluation the following day and was found to have a T12 compression fracture. She presents for evaluation. Today she reports significant improvement in her symptoms. She reports midline low back pain. She denies any radicular leg pain, paresthesias, or overt weakness. No bowel/bladder complaints or foot drop. She reports pain is worse with bending and twisting.    Past Medical History:   Diagnosis Date    AC (acromioclavicular) joint arthritis     NO ACTIVE PROBLEMS        Past Medical History reviewed with patient during visit.    Past Surgical History:   Procedure Laterality Date    EYE SURGERY  as child    bilat.     Past Surgical History reviewed with patient during visit.    Current Outpatient Medications   Medication    methocarbamol (ROBAXIN) 750 MG tablet    Multiple Vitamins-Minerals (WOMENS 50+ MULTI VITAMIN PO)    oxyCODONE (ROXICODONE) 5 MG tablet     No current facility-administered medications for this visit.       Allergies   Allergen Reactions    Clindamycin Hives       Social History     Socioeconomic History    Marital status:      Spouse name: Matt    Number of children: 2    Years of education: None    Highest education level: None   Tobacco Use    Smoking status: Former     Types: Cigarettes     Quit date: 1987     Years since quittin.8    Smokeless tobacco: Never   Vaping Use    Vaping Use: Never used   Substance and Sexual Activity    Alcohol use: Yes     Comment: 1-2 uses/year    Drug use: No    Sexual activity: Yes     Partners: Male     Birth control/protection: Female Surgical     Comment: Essure   Other Topics Concern    Parent/sibling w/  CABG, MI or angioplasty before 65F 55M? No     Service No    Blood Transfusions No    Caffeine Concern No    Occupational Exposure No    Hobby Hazards No    Sleep Concern No    Stress Concern No    Weight Concern No    Special Diet No    Back Care No    Exercise Yes     Comment: exercise 6 times aweek    Bike Helmet No    Seat Belt Yes    Self-Exams Yes       Family History   Problem Relation Age of Onset    Heart Disease Father     Lipids Father     Diabetes Maternal Grandmother     Eye Disorder Maternal Grandmother         cataract    Heart Disease Maternal Grandmother     Arthritis Maternal Grandmother     Hypertension Maternal Grandmother     Hypertension Sister     Lipids Sister     Gynecology Sister         pelvic inflam disease    Allergies Mother     Respiratory Mother         emphysema    Heart Disease Mother         COPD    Arthritis Paternal Grandmother     Eye Disorder Paternal Grandmother         cataract    Gastrointestinal Disease Paternal Grandmother         diverticulitis    Lipids Paternal Grandfather     Diabetes Paternal Grandfather     Heart Disease Maternal Grandfather     Cardiovascular Maternal Grandfather         MI    No Known Problems Son          ROS: 10 point ROS neg other than the symptoms noted above in the HPI.    Vital Signs:   /80   Pulse 72   Temp 97.2  F (36.2  C) (Temporal)   Resp 18   Wt 156 lb (70.8 kg)   LMP 12/04/2016   SpO2 99%   BMI 25.18 kg/m        Examination:  Constitutional:  Alert, well nourished, NAD.  Memory: recent and remote memory   HEENT: Normocephalic, atraumatic.   Pulm:  Without shortness of breath   CV:  No pitting edema of BLE.      Neurological:  Awake  Alert  Oriented x 3  Speech clear    Motor exam:   Hip Flexion:                 Right: 5/5  Left:  5/5  Hip Abduction:             Right:  5/5  Left:  5/5  Hip Adduction:             Right:  5/5  Left:  5/5  Plantar Flexion:           Right:  5/5  Left:  5/5  Dorsal Flexion:             Right:  5/5  Left:  5/5  EHL:                            Right:  5/5  Left:  5/5     Sensation normal to bilateral upper and lower extremities  Muscle tone to bilateral upper and lower extremities   Gait: Able to stand from a seated position. Normal non-antalgic, non-myelopathic gait.  Able to heel/toe walk without loss of balance    Lumbar examination reveals mild tenderness of the spine.  Hip height is symmetrical. Negative SI joint, sciatic notch or greater trochanteric tenderness to palpation bilaterally.  Straight leg raise is negative bilaterally.      Imaging:   EXAM: CT LUMBAR SPINE W/O CONTRAST  LOCATION: [facility]  DATE: 10/22/2023     INDICATION: low back injury fall from horse; Low back pain; Trauma and or suspected fracture; Significant trauma; No known automatically detected potential contraindications to CT  COMPARISON: None.  TECHNIQUE: Routine CT Lumbar Spine without IV contrast. Multiplanar reformats. Dose reduction techniques were used.      FINDINGS:  Acute superior plate T12 compression fracture with 25% vertebral body height loss. No other evidence of acute fracture or subluxation of the lumbar spine by CT imaging. Anterior marginal osteophytes at T12/L1 - L4/L5. The partially imaged intracranial   contents are unremarkable. No prevertebral soft tissue swelling. Colonic diverticulosis without evidence of diverticulitis.     T12/L1:  No posterior disc bulge or spinal canal narrowing. No neural foraminal narrowing.     L1/L2:  No posterior disc bulge or spinal canal narrowing. No neural foraminal narrowing.     L2/L3:  No posterior disc bulge or spinal canal narrowing. No neural foraminal narrowing.     L3/L4:  No posterior disc bulge or spinal canal narrowing. No neural foraminal narrowing.       L4/L5:  Symmetric disc bulge and mild facet arthropathy without significant spinal canal narrowing. No neural foraminal narrowing.      L5/S1: Symmetric disc bulge and  moderate facet arthropathy without significant spinal canal narrowing. Mild bilateral neural from narrowing.                                                                       IMPRESSION:  1.  Acute superior endplate T12 compression fracture with 25% vertebral body height loss  2.  No other evidence of acute fracture or subluxation of the lumbar spine by CT imaging.    Assessment/Plan:   Thoracic 12 compression fracture    Will obtain spinal brace which should be worn when out of bed. Orthotics to fit her for the brace today. She should avoid bending, twisting, and lifting. Robaxin sent to her pharmacy for back spasms. Follow up in 6 weeks with xray prior. She verbalized understanding and agreement.    Patient Instructions   -Orthotics consult for spinal brace. Brace to be worn when out of bed. Ok to have off if lying or sitting down.  -Avoid bending and twisting.  -Robaxin (muscle relaxer) sent to your pharmacy. Please take sparingly.  -Follow up in clinic in 6 weeks with xray prior. You may schedule this at the  or by contacting our office.   -Please contact our clinic with questions or concerns at [phone].    Susan Parks, [facility] Neurosurgery  [address] [place] [address]  Tel [phone]  Fax [phone]

## 2023-12-14 ENCOUNTER — ANCILLARY PROCEDURE (OUTPATIENT)
Dept: GENERAL RADIOLOGY | Facility: CLINIC | Age: 63
End: 2023-12-14
Attending: NURSE PRACTITIONER
Payer: COMMERCIAL

## 2023-12-14 ENCOUNTER — OFFICE VISIT (OUTPATIENT)
Dept: NEUROSURGERY | Facility: CLINIC | Age: 63
End: 2023-12-14
Payer: COMMERCIAL

## 2023-12-14 VITALS
WEIGHT: 156.3 LBS | TEMPERATURE: 97.5 F | HEIGHT: 66 IN | SYSTOLIC BLOOD PRESSURE: 128 MMHG | DIASTOLIC BLOOD PRESSURE: 74 MMHG | BODY MASS INDEX: 25.12 KG/M2

## 2023-12-14 DIAGNOSIS — S22.080A T12 COMPRESSION FRACTURE, INITIAL ENCOUNTER (H): ICD-10-CM

## 2023-12-14 DIAGNOSIS — S22.080A T12 COMPRESSION FRACTURE, INITIAL ENCOUNTER (H): Primary | ICD-10-CM

## 2023-12-14 PROCEDURE — 72100 X-RAY EXAM L-S SPINE 2/3 VWS: CPT | Mod: TC | Performed by: RADIOLOGY

## 2023-12-14 PROCEDURE — 99213 OFFICE O/P EST LOW 20 MIN: CPT | Performed by: NURSE PRACTITIONER

## 2023-12-14 ASSESSMENT — PAIN SCALES - GENERAL: PAINLEVEL: MILD PAIN (2)

## 2023-12-14 NOTE — PATIENT INSTRUCTIONS
-Continue to avoid bending, lifting, and twisting. Wear your brace for comfort.  -Follow up in 6 weeks with xray prior.  -Please contact our clinic with questions or concerns at [phone].

## 2023-12-14 NOTE — LETTER
"    12/14/2023         RE: Dian[facility]man  [address] [facility] [facility] [address]        Dear Colleague,    Thank you for referring your patient, Diann Altman, to the [facility] NEUROSURGERY CLINIC [place]. Please see a copy of my visit note below.    Diann Altman is a 63 year old female who presents for:  Chief Complaint   Patient presents with     Neurologic Problem        Initial Vitals:  /74 (BP Location: Right arm, Patient Position: Sitting, Cuff Size: Adult Regular)   Temp 97.5  F (36.4  C) (Temporal)   Ht 5' 6\" (1.676 m)   Wt 156 lb 4.8 oz (70.9 kg)   LMP 12/04/2016   BMI 25.23 kg/m   Estimated body mass index is 25.23 kg/m  as calculated from the following:    Height as of this encounter: 5' 6\" (1.676 m).    Weight as of this encounter: 156 lb 4.8 oz (70.9 kg).. Body surface area is 1.82 meters squared. BP completed using cuff size: regular  Mild Pain (2)    Nursing Comments:     Deborah Alvarado MA      [facility] Neurosurgery  Neurosurgery Follow Up:    HPI: 63F who presents for a follow up from a T12 compression fracture after a fall off a horse on 10/21/2023. Today she reports much improvement in her low back pain. She does report some worsening of symptoms after going to work yesterday. She continues to wear her brace. She denies any new or worsening symptoms. No radicular pain, paresthesias, or overt weakness.      Medical, surgical, family, and social history unchanged since prior exam.  Exam:  Constitutional:  Alert, well nourished, NAD.  HEENT: Normocephalic, atraumatic.   Pulm:  Without shortness of breath   CV:  No pitting edema of BLE.      Vital Signs:  /74 (BP Location: Right arm, Patient Position: Sitting, Cuff Size: Adult Regular)   Temp 97.5  F (36.4  C) (Temporal)   Ht 5' 6\" (1.676 m)   Wt 156 lb 4.8 oz (70.9 kg)   LMP 12/04/2016   BMI 25.23 kg/m      Neurological:  Awake  Alert  Oriented x 3  Motor exam:        IP Q DF PF EHL  R   5  5   5   5    " 5  L   5  5   5   5    5     Able to spontaneously move L/E bilaterally  Sensation intact throughout all L/E dermatomes     Imaging:  LUMBAR SPINE TWO TO THREE VIEWS  12/14/2023 9:13 AM      HISTORY: T12 compression fracture, initial encounter (H).     COMPARISON: CT of the lumbar spine 10/22/2023.                                                                      IMPRESSION: Nomenclature is based on five lumbar vertebral bodies.  There is redemonstration of a T12 superior endplate compression  fracture with anterior wedging and mild to moderate vertebral height  loss, not grossly changed in configuration compared to prior CT,  accounting for differences in technique. As before, there is evidence  for retropulsion of the posterior superior endplate into the ventral  epidural space. There is no other gross vertebral body height loss.  Mild focal kyphosis centered at T11-T12. Lateral alignment otherwise  appears normal. There is mild sigmoid thoracolumbar curvature with  right apex at approximately T10 and left apex at approximately L3-L4.     Moderate disc space narrowing at T11-T12. Minimal disc space narrowing  elsewhere. There is mild lower lumbar degenerative facet arthropathy.  Small area of calcification projecting over the right upper quadrant  of the abdomen may represent cholelithiasis.    A/P: Thoracic 12 compression fracture  Will continue activity modification and brace at this time. She will follow up in 6 weeks with repeat lumbar xray prior. She verbalized understanding and agreement.  Patient Instructions   -Continue to avoid bending, lifting, and twisting. Wear your brace for comfort.  -Follow up in 6 weeks with xray prior.  -Please contact our clinic with questions or concerns at [phone].    Susan Parks, [address] [address]  Tel [phone]  Fax [phone]      Again, thank you for allowing me to participate  in the care of your patient.        Sincerely,        Susan Parks NP

## 2023-12-14 NOTE — PROGRESS NOTES
"Diann Altman is a 63 year old female who presents for:  Chief Complaint   Patient presents with    Neurologic Problem        Initial Vitals:  /74 (BP Location: Right arm, Patient Position: Sitting, Cuff Size: Adult Regular)   Temp 97.5  F (36.4  C) (Temporal)   Ht 5' 6\" (1.676 m)   Wt 156 lb 4.8 oz (70.9 kg)   LMP 12/04/2016   BMI 25.23 kg/m   Estimated body mass index is 25.23 kg/m  as calculated from the following:    Height as of this encounter: 5' 6\" (1.676 m).    Weight as of this encounter: 156 lb 4.8 oz (70.9 kg).. Body surface area is 1.82 meters squared. BP completed using cuff size: regular  Mild Pain (2)    Nursing Comments:     Deborah Alvarado MA    "

## 2023-12-14 NOTE — PROGRESS NOTES
"[facility] Neurosurgery  Neurosurgery Follow Up:    HPI: 63F who presents for a follow up from a T12 compression fracture after a fall off a horse on 10/21/2023. Today she reports much improvement in her low back pain. She does report some worsening of symptoms after going to work yesterday. She continues to wear her brace. She denies any new or worsening symptoms. No radicular pain, paresthesias, or overt weakness.      Medical, surgical, family, and social history unchanged since prior exam.  Exam:  Constitutional:  Alert, well nourished, NAD.  HEENT: Normocephalic, atraumatic.   Pulm:  Without shortness of breath   CV:  No pitting edema of BLE.      Vital Signs:  /74 (BP Location: Right arm, Patient Position: Sitting, Cuff Size: Adult Regular)   Temp 97.5  F (36.4  C) (Temporal)   Ht 5' 6\" (1.676 m)   Wt 156 lb 4.8 oz (70.9 kg)   LMP 12/04/2016   BMI 25.23 kg/m      Neurological:  Awake  Alert  Oriented x 3  Motor exam:        IP Q DF PF EHL  R   5  5   5   5    5  L   5  5   5   5    5     Able to spontaneously move L/E bilaterally  Sensation intact throughout all L/E dermatomes     Imaging:  LUMBAR SPINE TWO TO THREE VIEWS  12/14/2023 9:13 AM      HISTORY: T12 compression fracture, initial encounter (H).     COMPARISON: CT of the lumbar spine 10/22/2023.                                                                      IMPRESSION: Nomenclature is based on five lumbar vertebral bodies.  There is redemonstration of a T12 superior endplate compression  fracture with anterior wedging and mild to moderate vertebral height  loss, not grossly changed in configuration compared to prior CT,  accounting for differences in technique. As before, there is evidence  for retropulsion of the posterior superior endplate into the ventral  epidural space. There is no other gross vertebral body height loss.  Mild focal kyphosis centered at T11-T12. Lateral alignment otherwise  appears normal. There is mild " sigmoid thoracolumbar curvature with  right apex at approximately T10 and left apex at approximately L3-L4.     Moderate disc space narrowing at T11-T12. Minimal disc space narrowing  elsewhere. There is mild lower lumbar degenerative facet arthropathy.  Small area of calcification projecting over the right upper quadrant  of the abdomen may represent cholelithiasis.    A/P: Thoracic 12 compression fracture  Will continue activity modification and brace at this time. She will follow up in 6 weeks with repeat lumbar xray prior. She verbalized understanding and agreement.  Patient Instructions   -Continue to avoid bending, lifting, and twisting. Wear your brace for comfort.  -Follow up in 6 weeks with xray prior.  -Please contact our clinic with questions or concerns at [phone].    Susan Parks, [facility] Neurosurgery  [address] [place] [address]  Tel [phone]  Fax [phone]

## 2024-01-29 ENCOUNTER — ANCILLARY PROCEDURE (OUTPATIENT)
Dept: GENERAL RADIOLOGY | Facility: CLINIC | Age: 64
End: 2024-01-29
Attending: NURSE PRACTITIONER
Payer: COMMERCIAL

## 2024-01-29 ENCOUNTER — OFFICE VISIT (OUTPATIENT)
Dept: NEUROSURGERY | Facility: CLINIC | Age: 64
End: 2024-01-29
Payer: COMMERCIAL

## 2024-01-29 VITALS
WEIGHT: 155 LBS | SYSTOLIC BLOOD PRESSURE: 118 MMHG | RESPIRATION RATE: 18 BRPM | HEART RATE: 80 BPM | DIASTOLIC BLOOD PRESSURE: 72 MMHG | TEMPERATURE: 97.8 F | BODY MASS INDEX: 25.02 KG/M2

## 2024-01-29 DIAGNOSIS — S22.080A T12 COMPRESSION FRACTURE, INITIAL ENCOUNTER (H): ICD-10-CM

## 2024-01-29 DIAGNOSIS — S22.080A T12 COMPRESSION FRACTURE, INITIAL ENCOUNTER (H): Primary | ICD-10-CM

## 2024-01-29 PROCEDURE — 72100 X-RAY EXAM L-S SPINE 2/3 VWS: CPT | Mod: TC | Performed by: RADIOLOGY

## 2024-01-29 PROCEDURE — 99213 OFFICE O/P EST LOW 20 MIN: CPT | Performed by: NURSE PRACTITIONER

## 2024-01-29 ASSESSMENT — PAIN SCALES - GENERAL: PAINLEVEL: NO PAIN (0)

## 2024-01-29 NOTE — PATIENT INSTRUCTIONS
- May increase lifting restriction and activity as tolerated.    - Follow up as needed.    - Call the clinic at [phone] for increased pain or any other questions and concerns.

## 2024-01-29 NOTE — LETTER
1/29/2024         RE: Diann Altman  [address] [facility] [address]        Dear Colleague,    Thank you for referring your patient, Diann Altman, to the [facility] NEUROSURGERY CLINIC [place]. Please see a copy of my visit note below.    [facility] Neurosurgery  Neurosurgery Follow Up:    HPI: 3 months s/p T12 fracture after falling off a horse. Today she present for a follow up.  She denies pain. No back pain, radicular leg pain, paresthesias, or overt weakness. Has weaned from the brace and only wearing when heavy lifting. No concerns today.     Medical, surgical, family, and social history unchanged since prior exam.  Exam:  Constitutional:  Alert, well nourished, NAD.  HEENT: Normocephalic, atraumatic.   Pulm:  Without shortness of breath   CV:  No pitting edema of BLE.      Vital Signs:  /72   Pulse 80   Temp 97.8  F (36.6  C) (Temporal)   Resp 18   Wt 155 lb (70.3 kg)   LMP 12/04/2016   BMI 25.02 kg/m      Neurological:  Awake  Alert  Oriented x 3  Motor exam:        IP Q DF PF EHL  R   5  5   5   5    5  L   5  5   5   5    5     Able to spontaneously move L/E bilaterally  Sensation intact throughout all L/E dermatomes    Imaging:   XR LUMBAR SPINE 2/3 VIEWS  1/29/2024 8:40 AM      HISTORY: T12 compression fracture, initial encounter (H)     COMPARISON: None.                                                                       IMPRESSION: Unchanged left convex curvature centered at L3. Unchanged  chronic compression deformity of T12 with approximately 40% height  loss in the most affected anterior vertebral body with resultant  anterior wedging. Multilevel disc height loss and osteophyte  formation.    A/P: thoracic 12 fracture  May continue to increase activities as tolerated. Follow up as needed. She verbalized understanding and agreement.  Patient Instructions   - May increase lifting restriction and activity as tolerated.    - Follow up as needed.    - Call the  clinic at [phone] for increased pain or any other questions and concerns.    Susan Parks, [facility]  [address] [address]  Tel [phone]  Fax [phone]      Again, thank you for allowing me to participate in the care of your patient.        Sincerely,        Susan Parks, NP

## 2024-01-29 NOTE — PROGRESS NOTES
[facility] Neurosurgery  Neurosurgery Follow Up:    HPI: 3 months s/p T12 fracture after falling off a horse. Today she present for a follow up.  She denies pain. No back pain, radicular leg pain, paresthesias, or overt weakness. Has weaned from the brace and only wearing when heavy lifting. No concerns today.     Medical, surgical, family, and social history unchanged since prior exam.  Exam:  Constitutional:  Alert, well nourished, NAD.  HEENT: Normocephalic, atraumatic.   Pulm:  Without shortness of breath   CV:  No pitting edema of BLE.      Vital Signs:  /72   Pulse 80   Temp 97.8  F (36.6  C) (Temporal)   Resp 18   Wt 155 lb (70.3 kg)   LMP 12/04/2016   BMI 25.02 kg/m      Neurological:  Awake  Alert  Oriented x 3  Motor exam:        IP Q DF PF EHL  R   5  5   5   5    5  L   5  5   5   5    5     Able to spontaneously move L/E bilaterally  Sensation intact throughout all L/E dermatomes    Imaging:   XR LUMBAR SPINE 2/3 VIEWS  1/29/2024 8:40 AM      HISTORY: T12 compression fracture, initial encounter (H)     COMPARISON: None.                                                                       IMPRESSION: Unchanged left convex curvature centered at L3. Unchanged  chronic compression deformity of T12 with approximately 40% height  loss in the most affected anterior vertebral body with resultant  anterior wedging. Multilevel disc height loss and osteophyte  formation.    A/P: thoracic 12 fracture  May continue to increase activities as tolerated. Follow up as needed. She verbalized understanding and agreement.  Patient Instructions   - May increase lifting restriction and activity as tolerated.    - Follow up as needed.    - Call the clinic at [phone] for increased pain or any other questions and concerns.    Susan Parks CNP  [facility] Neurosurgery  [address] [place] [address]  [address] [place] [address]  Tel [phone]  Fax [phone]

## 2024-01-29 NOTE — NURSING NOTE
"Diann Altman is a 63 year old female who presents for:  Chief Complaint   Patient presents with    Neurologic Problem     T12 compression fracture, initial encounter  DOS: 10/21/2023        Initial Vitals:  /72   Pulse 80   Temp 97.8  F (36.6  C) (Temporal)   Resp 18   Wt 155 lb (70.3 kg)   LMP 12/04/2016   BMI 25.02 kg/m   Estimated body mass index is 25.02 kg/m  as calculated from the following:    Height as of 12/14/23: 5' 6\" (1.676 m).    Weight as of this encounter: 155 lb (70.3 kg).. Body surface area is 1.81 meters squared. BP completed using cuff size: regular  No Pain (0)    Nursing Comments:     Eden Mckinney    "

## 2024-07-17 SDOH — HEALTH STABILITY: PHYSICAL HEALTH: ON AVERAGE, HOW MANY MINUTES DO YOU ENGAGE IN EXERCISE AT THIS LEVEL?: 20 MIN

## 2024-07-17 SDOH — HEALTH STABILITY: PHYSICAL HEALTH: ON AVERAGE, HOW MANY DAYS PER WEEK DO YOU ENGAGE IN MODERATE TO STRENUOUS EXERCISE (LIKE A BRISK WALK)?: 4 DAYS

## 2024-07-17 ASSESSMENT — SOCIAL DETERMINANTS OF HEALTH (SDOH): HOW OFTEN DO YOU GET TOGETHER WITH FRIENDS OR RELATIVES?: ONCE A WEEK

## 2024-07-18 PROBLEM — S22.000A COMPRESSION FRACTURE OF BODY OF THORACIC VERTEBRA (H): Status: ACTIVE | Noted: 2023-10-21

## 2024-07-19 ENCOUNTER — OFFICE VISIT (OUTPATIENT)
Dept: FAMILY MEDICINE | Facility: CLINIC | Age: 64
End: 2024-07-19
Payer: COMMERCIAL

## 2024-07-19 ENCOUNTER — ORDERS ONLY (AUTO-RELEASED) (OUTPATIENT)
Dept: FAMILY MEDICINE | Facility: CLINIC | Age: 64
End: 2024-07-19

## 2024-07-19 ENCOUNTER — HOSPITAL ENCOUNTER (OUTPATIENT)
Dept: MAMMOGRAPHY | Facility: CLINIC | Age: 64
Discharge: HOME | End: 2024-07-19
Attending: FAMILY MEDICINE | Admitting: FAMILY MEDICINE
Payer: COMMERCIAL

## 2024-07-19 VITALS
TEMPERATURE: 97.8 F | HEIGHT: 66 IN | RESPIRATION RATE: 14 BRPM | BODY MASS INDEX: 24.75 KG/M2 | SYSTOLIC BLOOD PRESSURE: 114 MMHG | DIASTOLIC BLOOD PRESSURE: 70 MMHG | HEART RATE: 82 BPM | OXYGEN SATURATION: 97 % | WEIGHT: 154 LBS

## 2024-07-19 DIAGNOSIS — S22.000A COMPRESSION FRACTURE OF BODY OF THORACIC VERTEBRA (H): ICD-10-CM

## 2024-07-19 DIAGNOSIS — Z12.31 VISIT FOR SCREENING MAMMOGRAM: ICD-10-CM

## 2024-07-19 DIAGNOSIS — Z12.11 SCREEN FOR COLON CANCER: ICD-10-CM

## 2024-07-19 DIAGNOSIS — Z53.20 CERVICAL CANCER SCREENING DECLINED: ICD-10-CM

## 2024-07-19 DIAGNOSIS — Z00.00 ROUTINE GENERAL MEDICAL EXAMINATION AT A HEALTH CARE FACILITY: Primary | ICD-10-CM

## 2024-07-19 DIAGNOSIS — E78.5 HYPERLIPIDEMIA LDL GOAL <130: ICD-10-CM

## 2024-07-19 LAB
CHOLEST SERPL-MCNC: 235 MG/DL
FASTING STATUS PATIENT QL REPORTED: YES
HDLC SERPL-MCNC: 62 MG/DL
LDLC SERPL CALC-MCNC: 135 MG/DL
NONHDLC SERPL-MCNC: 173 MG/DL
TRIGL SERPL-MCNC: 189 MG/DL

## 2024-07-19 PROCEDURE — 80061 LIPID PANEL: CPT | Performed by: STUDENT IN AN ORGANIZED HEALTH CARE EDUCATION/TRAINING PROGRAM

## 2024-07-19 PROCEDURE — 36415 COLL VENOUS BLD VENIPUNCTURE: CPT | Performed by: STUDENT IN AN ORGANIZED HEALTH CARE EDUCATION/TRAINING PROGRAM

## 2024-07-19 PROCEDURE — 99396 PREV VISIT EST AGE 40-64: CPT | Performed by: STUDENT IN AN ORGANIZED HEALTH CARE EDUCATION/TRAINING PROGRAM

## 2024-07-19 PROCEDURE — 77063 BREAST TOMOSYNTHESIS BI: CPT

## 2024-07-19 ASSESSMENT — PAIN SCALES - GENERAL: PAINLEVEL: NO PAIN (0)

## 2024-07-19 NOTE — PATIENT INSTRUCTIONS
Patient Education   Preventive Care Advice   This is general advice given by our system to help you stay healthy. However, your care team may have specific advice just for you. Please talk to your care team about your preventive care needs.  Nutrition  Eat 5 or more servings of fruits and vegetables each day.  Try wheat bread, brown rice and whole grain pasta (instead of white bread, rice, and pasta).  Get enough calcium and vitamin D. Check the label on foods and aim for 100% of the RDA (recommended daily allowance).  Lifestyle  Exercise at least 150 minutes each week  (30 minutes a day, 5 days a week).  Do muscle strengthening activities 2 days a week. These help control your weight and prevent disease.  No smoking.  Wear sunscreen to prevent skin cancer.  Have a dental exam and cleaning every 6 months.  Yearly exams  See your health care team every year to talk about:  Any changes in your health.  Any medicines your care team has prescribed.  Preventive care, family planning, and ways to prevent chronic diseases.  Shots (vaccines)   HPV shots (up to age 26), if you've never had them before.  Hepatitis B shots (up to age 59), if you've never had them before.  COVID-19 shot: Get this shot when it's due.  Flu shot: Get a flu shot every year.  Tetanus shot: Get a tetanus shot every 10 years.  Pneumococcal, hepatitis A, and RSV shots: Ask your care team if you need these based on your risk.  Shingles shot (for age 50 and up)  General health tests  Diabetes screening:  Starting at age 35, Get screened for diabetes at least every 3 years.  If you are younger than age 35, ask your care team if you should be screened for diabetes.  Cholesterol test: At age 39, start having a cholesterol test every 5 years, or more often if advised.  Bone density scan (DEXA): At age 50, ask your care team if you should have this scan for osteoporosis (brittle bones).  Hepatitis C: Get tested at least once in your life.  STIs (sexually  transmitted infections)  Before age 24: Ask your care team if you should be screened for STIs.  After age 24: Get screened for STIs if you're at risk. You are at risk for STIs (including HIV) if:  You are sexually active with more than one person.  You don't use condoms every time.  You or a partner was diagnosed with a sexually transmitted infection.  If you are at risk for HIV, ask about PrEP medicine to prevent HIV.  Get tested for HIV at least once in your life, whether you are at risk for HIV or not.  Cancer screening tests  Cervical cancer screening: If you have a cervix, begin getting regular cervical cancer screening tests starting at age 21.  Breast cancer scan (mammogram): If you've ever had breasts, begin having regular mammograms starting at age 40. This is a scan to check for breast cancer.  Colon cancer screening: It is important to start screening for colon cancer at age 45.  Have a colonoscopy test every 10 years (or more often if you're at risk) Or, ask your provider about stool tests like a FIT test every year or Cologuard test every 3 years.  To learn more about your testing options, visit:   .  For help making a decision, visit:   https://bit.ly/ey41776.  Prostate cancer screening test: If you have a prostate, ask your care team if a prostate cancer screening test (PSA) at age 55 is right for you.  Lung cancer screening: If you are a current or former smoker ages 50 to 80, ask your care team if ongoing lung cancer screenings are right for you.  For informational purposes only. Not to replace the advice of your health care provider. Copyright   2023 [facility]. All rights reserved. Clinically reviewed by the [facility]. [employer] 164055 - REV 01/24.  Learning About Being Physically Active  What is physical activity?     Being physically active means doing any kind of activity that gets your body moving.  The types of physical activity that can help you get  "fit and stay healthy include:  Aerobic or \"cardio\" activities. These make your heart beat faster and make you breathe harder, such as brisk walking, riding a bike, or running. They strengthen your heart and lungs and build up your endurance.  Strength training activities. These make your muscles work against, or \"resist,\" something. Examples include lifting weights or doing push-ups. These activities help tone and strengthen your muscles and bones.  Stretches. These let you move your joints and muscles through their full range of motion. Stretching helps you be more flexible.  Reaching a balance between these three types of physical activity is important because each one contributes to your overall fitness.  What are the benefits of being active?  Being active is one of the best things you can do for your health. It helps you to:  Feel stronger and have more energy to do all the things you like to do.  Focus better at school or work.  Feel, think, and sleep better.  Reach and stay at a healthy weight.  Lose fat and build lean muscle.  Lower your risk for serious health problems, including diabetes, heart attack, high blood pressure, and some cancers.  Keep your heart, lungs, bones, muscles, and joints strong and healthy.  How can you make being active part of your life?  Start slowly. Make it your long-term goal to get at least 30 minutes of exercise on most days of the week. Walking is a good choice. You also may want to do other activities, such as running, swimming, cycling, or playing tennis or team sports.  Pick activities that you like--ones that make your heart beat faster, your muscles stronger, and your muscles and joints more flexible. If you find more than one thing you like doing, do them all. You don't have to do the same thing every day.  Get your heart pumping every day. Any activity that makes your heart beat faster and keeps it at that rate for a while counts.  Here are some great ways to get your " "heart beating faster:  Go for a brisk walk, run, or hike.  Go for a swim or bike ride.  Take an online exercise class or dance.  Play a game of touch football, basketball, or soccer.  Play tennis, pickleball, or racquetball.  Climb stairs.  Even some household chores can be aerobic. Just do them at a faster pace. Raking or mowing the lawn, sweeping the garage, and vacuuming and cleaning your home all can help get your heart rate up.  Strengthen your muscles during the week. You don't have to lift heavy weights or grow big, bulky muscles to get stronger. Doing a few simple activities that make your muscles work against, or \"resist,\" something can help you get stronger. Aim for at least twice a week.  For example, you can:  Do push-ups or sit-ups, which use your own body weight as resistance.  Lift weights or dumbbells or use stretch bands at home or in a gym or community center.  Stretch your muscles often. Stretching will help you as you become more active. It can help you stay flexible and loosen tight muscles. It can also help improve your balance and posture and can be a great way to relax.  Be sure to stretch the muscles you'll be using when you work out. It's best to warm your muscles slightly before you stretch them. Walk or do some other light aerobic activity for a few minutes. Then start stretching.  When you stretch your muscles:  Do it slowly. Stretching is not about going fast or making sudden movements.  Don't push or bounce during a stretch.  Hold each stretch for at least 15 to 30 seconds, if you can. You should feel a stretch in the muscle, but not pain.  Breathe out as you do the stretch. Then breathe in as you hold the stretch. Don't hold your breath.  If you're worried about how more activity might affect your health, have a checkup before you start. Follow any special advice your doctor gives you for getting a smart start.  Where can you learn more?  Go to " "https://www.[employer].net/patiented  Enter W332 in the search box to learn more about \"Learning About Being Physically Active.\"  Current as of: June 5, 2023               Content Version: 14.0    9556-6384 [employer].   Care instructions adapted under license by your healthcare professional. If you have questions about a medical condition or this instruction, always ask your healthcare professional. [employer] disclaims any warranty or liability for your use of this information.      Eating Healthy Foods: Care Instructions  With every meal, you can make healthy food choices. Try to eat a variety of fruits, vegetables, whole grains, lean proteins, and low-fat dairy products. This can help you get the right balance of nutrients, including vitamins and minerals. Small changes add up over time. You can start by adding one healthy food to your meals each day.    Try to make half your plate fruits and vegetables, one-fourth whole grains, and one-fourth lean proteins. Try including dairy with your meals.   Eat more fruits and vegetables. Try to have them with most meals and snacks.   Foods for healthy eating    Fruits    These can be fresh, frozen, canned, or dried.  Try to choose whole fruit rather than fruit juice.  Eat a variety of colors.    Vegetables    These can be fresh, frozen, canned, or dried.  Beans, peas, and lentils count too.    Whole grains    Choose whole-grain breads, cereals, and noodles.  Try brown rice.    Lean proteins    These can include lean meat, poultry, fish, and eggs.  You can also have tofu, beans, peas, lentils, nuts, and seeds.    Dairy    Try milk, yogurt, and cheese.  Choose low-fat or fat-free when you can.  If you need to, use lactose-free milk or fortified plant-based milk products, such as soy milk.    Water    Drink water when you're thirsty.  Limit sugar-sweetened drinks, including soda, fruit drinks, and sports drinks.  Where can you learn more?  Go to " "https://www.[employer].net/patiented  Enter T756 in the search box to learn more about \"Eating Healthy Foods: Care Instructions.\"  Current as of: September 20, 2023               Content Version: 14.0    1392-6938 [employer].   Care instructions adapted under license by your healthcare professional. If you have questions about a medical condition or this instruction, always ask your healthcare professional. [employer] disclaims any warranty or liability for your use of this information.         "

## 2024-07-19 NOTE — PROGRESS NOTES
Preventive Care Visit  [facility]  Carla Cardoza DO, Family Medicine  Jul 19, 2024      Assessment & Plan     Routine general medical examination at a health care facility  Recommend annual wellness visits, screens, and immunizations as indicated.    Hyperlipidemia LDL goal <130  Stable labs from last check, ASCVD <5%, recommend lifestyle measures, no indication for statin at this time unless desires.   - Lipid panel reflex to direct LDL Non-fasting; Future  - Lipid panel reflex to direct LDL Non-fasting    Compression fracture of body of thoracic vertebra (H)  No concerns. Pain rarely issue and well tolerated when present.     Screen for colon cancer  Opting for cologuard.  - COLOGUARD([employer]); Future    Cervical cancer screening declined    Counseling  Appropriate preventive services were addressed with this patient via screening, questionnaire, or discussion as appropriate for fall prevention, nutrition, physical activity, Tobacco-use cessation, weight loss and cognition.  Checklist reviewing preventive services available has been given to the patient.  Reviewed patient's diet, addressing concerns and/or questions.   She is at risk for psychosocial distress and has been provided with information to reduce risk.       Andrew Tidwell is a 64 year old, presenting for the following:  Physical        7/19/2024     7:45 AM   Additional Questions   Roomed by Dodie AGUILAR         7/19/2024   Forms   Any forms needing to be completed Yes           Health Care Directive  Patient does not have a Health Care Directive or Living Will: Discussed advance care planning with patient; information given to patient to review.    HPI  Declines concerns. Needs form completed for work related to annual preventative and health insurance.         7/17/2024   General Health   How would you rate your overall physical health? Good   Feel stress (tense, anxious, or unable to sleep) Only a little      (!)  STRESS CONCERN      2024   Nutrition   Three or more servings of calcium each day? Yes   Diet: Regular (no restrictions)   How many servings of fruit and vegetables per day? (!) 2-3   How many sweetened beverages each day? 0-1            2024   Exercise   Days per week of moderate/strenous exercise 4 days   Average minutes spent exercising at this level 20 min            2024   Social Factors   Frequency of gathering with friends or relatives Once a week   Worry food won't last until get money to buy more No   Food not last or not have enough money for food? No   Do you have housing? (Housing is defined as stable permanent housing and does not include staying ouside in a car, in a tent, in an abandoned building, in an overnight shelter, or couch-surfing.) Yes   Are you worried about losing your housing? No   Lack of transportation? No   Unable to get utilities (heat,electricity)? No            2024   Fall Risk   Fallen 2 or more times in the past year? No   Trouble with walking or balance? No             2024   Dental   Dentist two times every year? Yes            2024   TB Screening   Were you born outside of the [place]? No            Today's PHQ-2 Score:       2024     9:12 AM   PHQ-2 ( 1999 [employer])   Q1: Little interest or pleasure in doing things 0   Q2: Feeling down, depressed or hopeless 0   PHQ-2 Score 0   Q1: Little interest or pleasure in doing things Not at all   Q2: Feeling down, depressed or hopeless Not at all   PHQ-2 Score 0           2024   Substance Use   Alcohol more than 3/day or more than 7/wk No   Do you use any other substances recreationally? No        Social History     Tobacco Use    Smoking status: Former     Current packs/day: 0.00     Types: Cigarettes     Quit date: 1987     Years since quittin.5    Smokeless tobacco: Never   Vaping Use    Vaping status: Never Used   Substance Use Topics    Alcohol use: Yes     Comment: 1-2 uses/year    Drug  use: No           7/3/2023   LAST FHS-7 RESULTS   1st degree relative breast or ovarian cancer No   Any relative bilateral breast cancer No   Any male have breast cancer No   Any ONE woman have BOTH breast AND ovarian cancer No   Any woman with breast cancer before 50yrs No   2 or more relatives with breast AND/OR ovarian cancer No   2 or more relatives with breast AND/OR bowel cancer No        Mammogram Screening - Mammogram every 1-2 years updated in Health Maintenance based on mutual decision making  7/3/23   1 - Negative - Scattered fibroglandular density         2024   STI Screening   New sexual partner(s) since last STI/HIV test? No        History of abnormal Pap smear: No - age 30- 64 PAP with HPV every 5 years recommended. Consider desire to stop at 66 if testing today normal as will have 4 consecutive negative screens if so.         Latest Ref Rng & Units 2019     8:31 AM 2016    12:00 AM 2012     9:00 AM   PAP / HPV   PAP (Historical)  NIL  NIL  NIL    HPV 16 DNA NEG^Negative Negative      HPV 18 DNA NEG^Negative Negative      Other HR HPV NEG^Negative Negative        ASCVD Risk   The 10-year ASCVD risk score (Jacobo LEIGH, et al., 2019) is: 3.8%    Values used to calculate the score:      Age: 64 years      Sex: Female      Is Non- : No      Diabetic: No      Tobacco smoker: No      Systolic Blood Pressure: 114 mmHg      Is BP treated: No      HDL Cholesterol: 61 mg/dL      Total Cholesterol: 237 mg/dL    Reviewed and updated as needed this visit by Provider                    Past Medical History:   Diagnosis Date    AC (acromioclavicular) joint arthritis     NO ACTIVE PROBLEMS      Past Surgical History:   Procedure Laterality Date    EYE SURGERY  as child    bilat.     OB History    Para Term  AB Living   2 2 2 0 0 2   SAB IAB Ectopic Multiple Live Births   0 0 0 0 0      # Outcome Date GA Lbr Luis Carlos/2nd Weight Sex Type Anes PTL Lv   2 Term             1 Term              Labs reviewed in EPIC  BP Readings from Last 3 Encounters:   24 114/70   24 118/72   23 128/74    Wt Readings from Last 3 Encounters:   24 69.9 kg (154 lb)   24 70.3 kg (155 lb)   23 70.9 kg (156 lb 4.8 oz)                  Patient Active Problem List   Diagnosis    HYPERLIPIDEMIA LDL GOAL <130    Compression fracture of body of thoracic vertebra (H)     Past Surgical History:   Procedure Laterality Date    EYE SURGERY  as child    bilat.       Social History     Tobacco Use    Smoking status: Former     Current packs/day: 0.00     Types: Cigarettes     Quit date: 1987     Years since quittin.5    Smokeless tobacco: Never   Substance Use Topics    Alcohol use: Yes     Comment: 1-2 uses/year     Family History   Problem Relation Age of Onset    Heart Disease Father     Lipids Father     Diabetes Maternal Grandmother     Eye Disorder Maternal Grandmother         cataract    Heart Disease Maternal Grandmother     Arthritis Maternal Grandmother     Hypertension Maternal Grandmother     Hypertension Sister     Lipids Sister     Gynecology Sister         pelvic inflam disease    Allergies Mother     Respiratory Mother         emphysema    Heart Disease Mother         COPD    Arthritis Paternal Grandmother     Eye Disorder Paternal Grandmother         cataract    Gastrointestinal Disease Paternal Grandmother         diverticulitis    Lipids Paternal Grandfather     Diabetes Paternal Grandfather     Heart Disease Maternal Grandfather     Cardiovascular Maternal Grandfather         MI    No Known Problems Son          No current outpatient medications on file.     Allergies   Allergen Reactions    Clindamycin Hives     Recent Labs   Lab Test 23  0900 23  0849 21  0748 19  0832   *  --  111* 131* 123*   HDL 61  --  61 67 71   TRIG 242*  --  185* 154* 127   ALT  --   --  24 25 26   CR  --  0.69 0.79 0.78 0.68  "  GFRESTIMATED  --  >90 85 82 >90   GFRESTBLACK  --   --   --  >90 >90   POTASSIUM  --  3.5 4.1 3.7 3.8   TSH 1.90  --   --   --   --           Review of Systems  Negative unless otherwise specified per HPI.     Objective    Exam  /70   Pulse 82   Temp 97.8  F (36.6  C) (Temporal)   Resp 14   Ht 1.677 m (5' 6.02\")   Wt 69.9 kg (154 lb)   LMP 12/04/2016   SpO2 97%   BMI 24.84 kg/m     Estimated body mass index is 24.84 kg/m  as calculated from the following:    Height as of this encounter: 1.677 m (5' 6.02\").    Weight as of this encounter: 69.9 kg (154 lb).    Physical Exam  GENERAL: alert and no acute distress  EYES: Eyes grossly normal to inspection, PERRL and conjunctivae and sclerae normal  HENT: nose and mouth without ulcers or lesions  RESP: normal rate and effort  CV: regular rate, no peripheral edema  ABDOMEN: soft, nontender, non-distended  MS: no gross musculoskeletal defects noted, no edema  SKIN: no suspicious lesions or rashes  NEURO: Normal strength and tone, mentation intact and speech normal  PSYCH: mentation appears normal, affect normal/bright        Signed Electronically by: Carla Cardoza,   "

## 2024-09-13 LAB — NONINV COLON CA DNA+OCC BLD SCRN STL QL: NEGATIVE

## 2025-07-11 SDOH — HEALTH STABILITY: PHYSICAL HEALTH: ON AVERAGE, HOW MANY MINUTES DO YOU ENGAGE IN EXERCISE AT THIS LEVEL?: 20 MIN

## 2025-07-11 SDOH — HEALTH STABILITY: PHYSICAL HEALTH: ON AVERAGE, HOW MANY DAYS PER WEEK DO YOU ENGAGE IN MODERATE TO STRENUOUS EXERCISE (LIKE A BRISK WALK)?: 5 DAYS

## 2025-07-11 ASSESSMENT — SOCIAL DETERMINANTS OF HEALTH (SDOH): HOW OFTEN DO YOU GET TOGETHER WITH FRIENDS OR RELATIVES?: TWICE A WEEK

## 2025-07-14 ENCOUNTER — OFFICE VISIT (OUTPATIENT)
Dept: FAMILY MEDICINE | Facility: CLINIC | Age: 65
End: 2025-07-14
Payer: COMMERCIAL

## 2025-07-14 VITALS
HEART RATE: 63 BPM | BODY MASS INDEX: 24.27 KG/M2 | TEMPERATURE: 97.2 F | SYSTOLIC BLOOD PRESSURE: 110 MMHG | HEIGHT: 66 IN | RESPIRATION RATE: 16 BRPM | OXYGEN SATURATION: 97 % | DIASTOLIC BLOOD PRESSURE: 72 MMHG | WEIGHT: 151 LBS

## 2025-07-14 DIAGNOSIS — Z13.1 SCREENING FOR DIABETES MELLITUS: ICD-10-CM

## 2025-07-14 DIAGNOSIS — Z28.21 PNEUMOCOCCAL VACCINATION DECLINED: ICD-10-CM

## 2025-07-14 DIAGNOSIS — Z28.21 COVID-19 VACCINATION DECLINED: ICD-10-CM

## 2025-07-14 DIAGNOSIS — Z13.6 SCREENING FOR CARDIOVASCULAR CONDITION: ICD-10-CM

## 2025-07-14 DIAGNOSIS — Z53.20 CERVICAL CANCER SCREENING DECLINED: ICD-10-CM

## 2025-07-14 DIAGNOSIS — E78.1 HYPERTRIGLYCERIDEMIA: ICD-10-CM

## 2025-07-14 DIAGNOSIS — Z13.0 SCREENING FOR DEFICIENCY ANEMIA: ICD-10-CM

## 2025-07-14 DIAGNOSIS — Z00.00 ROUTINE GENERAL MEDICAL EXAMINATION AT A HEALTH CARE FACILITY: Primary | ICD-10-CM

## 2025-07-14 DIAGNOSIS — M25.551 HIP PAIN, RIGHT: ICD-10-CM

## 2025-07-14 DIAGNOSIS — E78.5 HYPERLIPIDEMIA LDL GOAL <130: ICD-10-CM

## 2025-07-14 PROBLEM — Z87.81 HISTORY OF COMPRESSION FRACTURE OF VERTEBRAL COLUMN: Status: ACTIVE | Noted: 2023-10-21

## 2025-07-14 LAB
CHOLEST SERPL-MCNC: 238 MG/DL
ERYTHROCYTE [DISTWIDTH] IN BLOOD BY AUTOMATED COUNT: 12.9 % (ref 10–15)
EST. AVERAGE GLUCOSE BLD GHB EST-MCNC: 111 MG/DL
FASTING STATUS PATIENT QL REPORTED: NO
HBA1C MFR BLD: 5.5 %
HCT VFR BLD AUTO: 42.4 % (ref 35–47)
HDLC SERPL-MCNC: 56 MG/DL
HGB BLD-MCNC: 14.1 G/DL (ref 11.7–15.7)
LDLC SERPL CALC-MCNC: 123 MG/DL
MCH RBC QN AUTO: 31.7 PG (ref 26.5–33)
MCHC RBC AUTO-ENTMCNC: 33.3 G/DL (ref 31.5–36.5)
MCV RBC AUTO: 95 FL (ref 78–100)
NONHDLC SERPL-MCNC: 182 MG/DL
PLATELET # BLD AUTO: 280 10E3/UL (ref 150–450)
RBC # BLD AUTO: 4.45 10E6/UL (ref 3.8–5.2)
TRIGL SERPL-MCNC: 296 MG/DL
WBC # BLD AUTO: 7.5 10E3/UL (ref 4–11)

## 2025-07-14 PROCEDURE — 1126F AMNT PAIN NOTED NONE PRSNT: CPT | Performed by: STUDENT IN AN ORGANIZED HEALTH CARE EDUCATION/TRAINING PROGRAM

## 2025-07-14 PROCEDURE — 85027 COMPLETE CBC AUTOMATED: CPT | Performed by: STUDENT IN AN ORGANIZED HEALTH CARE EDUCATION/TRAINING PROGRAM

## 2025-07-14 PROCEDURE — 3049F LDL-C 100-129 MG/DL: CPT | Performed by: STUDENT IN AN ORGANIZED HEALTH CARE EDUCATION/TRAINING PROGRAM

## 2025-07-14 PROCEDURE — 36415 COLL VENOUS BLD VENIPUNCTURE: CPT | Performed by: STUDENT IN AN ORGANIZED HEALTH CARE EDUCATION/TRAINING PROGRAM

## 2025-07-14 PROCEDURE — 3044F HG A1C LEVEL LT 7.0%: CPT | Performed by: STUDENT IN AN ORGANIZED HEALTH CARE EDUCATION/TRAINING PROGRAM

## 2025-07-14 PROCEDURE — 83036 HEMOGLOBIN GLYCOSYLATED A1C: CPT | Performed by: STUDENT IN AN ORGANIZED HEALTH CARE EDUCATION/TRAINING PROGRAM

## 2025-07-14 PROCEDURE — 3074F SYST BP LT 130 MM HG: CPT | Performed by: STUDENT IN AN ORGANIZED HEALTH CARE EDUCATION/TRAINING PROGRAM

## 2025-07-14 PROCEDURE — 80061 LIPID PANEL: CPT | Performed by: STUDENT IN AN ORGANIZED HEALTH CARE EDUCATION/TRAINING PROGRAM

## 2025-07-14 PROCEDURE — 99396 PREV VISIT EST AGE 40-64: CPT | Performed by: STUDENT IN AN ORGANIZED HEALTH CARE EDUCATION/TRAINING PROGRAM

## 2025-07-14 PROCEDURE — 99213 OFFICE O/P EST LOW 20 MIN: CPT | Mod: 25 | Performed by: STUDENT IN AN ORGANIZED HEALTH CARE EDUCATION/TRAINING PROGRAM

## 2025-07-14 PROCEDURE — 3078F DIAST BP <80 MM HG: CPT | Performed by: STUDENT IN AN ORGANIZED HEALTH CARE EDUCATION/TRAINING PROGRAM

## 2025-07-14 ASSESSMENT — PAIN SCALES - GENERAL: PAINLEVEL_OUTOF10: NO PAIN (0)

## 2025-07-14 NOTE — PROGRESS NOTES
Preventive Care Visit  [facility]  Carla Cardoza DO, Family Medicine  Jul 14, 2025      Assessment & Plan     Routine general medical examination at a health care facility  Recommend annual wellness visits, screens, and immunizations as indicated.    Hyperlipidemia LDL goal <130  Hypertriglyceridemia   Screening for cardiovascular condition  Labs largely stable, with LDL at goal though still elevated compared to normal. NHDL and total also elevated, but stable. Triglycerides more notably elevated. ASCVD risk still low. Patient advised to avoid excess fats and sugars and to aim for appropriate exercise to reduce risk.   - Lipid panel reflex to direct LDL Non-fasting; Future  - Lipid panel reflex to direct LDL Non-fasting    Hip pain, right  6-8 month history of mild issues. Exam benign. Patient minimally concerned and not desiring to pursue further evaluation or management at this time. Discussed return considerations and advised to return for imaging should she note worsening or evolution of pain.     Screening for diabetes mellitus  A1c wnl, no diabetes.   - Hemoglobin A1c; Future  - Hemoglobin A1c    Screening for deficiency anemia  Normal counts, no anemia.   - CBC with platelets; Future  - CBC with platelets    Cervical cancer screening declined  Declines today. Plans to pursue next year as hopefully last necessary pap.   - CBC with platelets; Future  - CBC with platelets    Pneumococcal vaccination declined  COVID-19 vaccination declined    Counseling  Appropriate preventive services were addressed with this patient via screening, questionnaire, or discussion as appropriate for fall prevention, nutrition, physical activity, Tobacco-use cessation, social engagement, weight loss and cognition.  Checklist reviewing preventive services available has been given to the patient.  Reviewed patient's diet, addressing concerns and/or questions.   Reviewed preventive health counseling, as  reflected in patient instructions    Follow-up   No follow-ups on file.     Follow-up Visit   Expected date:  Jul 14, 2026 (Approximate)      Follow Up Appointment Details:     Follow-up with whom?: PCP    Follow-Up for what?: Adult Preventive    How?: In Person                 Subjective   Diann is a 64 year old, presenting for the following:  Physical        7/14/2025     3:11 PM   Additional Questions   Roomed by Dodie AGUILAR         7/14/2025   Forms   Any forms needing to be completed Yes          HPI  R hip pain recently. Tried move 3. Nothing seeming to change anything. Started doing yoga. 6-8 month duration.          Advance Care Planning  Discussed advance care planning with patient; informed AVS has link to [employer].        7/11/2025   General Health   How would you rate your overall physical health? Good   Feel stress (tense, anxious, or unable to sleep) Only a little   (!) STRESS CONCERN - no concerns        7/11/2025   Nutrition   Three or more servings of calcium each day? Yes   Diet: Regular (no restrictions)   How many servings of fruit and vegetables per day? (!) 2-3   How many sweetened beverages each day? 0-1         7/11/2025   Exercise   Days per week of moderate/strenous exercise 5 days   Average minutes spent exercising at this level 20 min         7/11/2025   Social Factors   Frequency of gathering with friends or relatives Twice a week   Worry food won't last until get money to buy more No   Food not last or not have enough money for food? No   Do you have housing? (Housing is defined as stable permanent housing and does not include staying outside in a car, in a tent, in an abandoned building, in an overnight shelter, or couch-surfing.) Yes   Are you worried about losing your housing? No   Lack of transportation? No   Unable to get utilities (heat,electricity)? No         7/11/2025   Fall Risk   Fallen 2 or more times in the past year? No   Trouble with walking or balance? No           2025   Dental   Dentist two times every year? Yes     Today's PHQ-2 Score:       2025     3:04 PM   PHQ-2 (  [employer])   Q1: Little interest or pleasure in doing things 0   Q2: Feeling down, depressed or hopeless 0   PHQ-2 Score 0    Q1: Little interest or pleasure in doing things Not at all   Q2: Feeling down, depressed or hopeless Not at all   PHQ-2 Score 0       Patient-reported         2025   Substance Use   Alcohol more than 3/day or more than 7/wk No   Do you use any other substances recreationally? No     Social History     Tobacco Use    Smoking status: Former     Current packs/day: 0.00     Types: Cigarettes     Quit date: 1987     Years since quittin.5    Smokeless tobacco: Never   Vaping Use    Vaping status: Never Used   Substance Use Topics    Alcohol use: Yes     Comment: 1-2 uses/year    Drug use: No           2024   LAST FHS-7 RESULTS   1st degree relative breast or ovarian cancer No   Any relative bilateral breast cancer No   Any male have breast cancer No   Any ONE woman have BOTH breast AND ovarian cancer No   Any woman with breast cancer before 50yrs No   2 or more relatives with breast AND/OR ovarian cancer No   2 or more relatives with breast AND/OR bowel cancer No     Mammogram Screening - Mammogram every 1-2 years updated in Health Maintenance based on mutual decision making  Last 24 Neg Cat 1  Compared to: 2023, 2022, 2021, and 2019   STI Screening   New sexual partner(s) since last STI/HIV test? No     History of abnormal Pap smear: No - age 30- 65 PAP with HPV every 5 years recommended        Latest Ref Rng & Units 2019     8:31 AM 2016    12:00 AM 2012     9:00 AM   PAP / HPV   PAP (Historical)  NIL  NIL  NIL    HPV 16 DNA NEG^Negative Negative      HPV 18 DNA NEG^Negative Negative      Other HR HPV NEG^Negative Negative        ASCVD Risk   The 10-year ASCVD risk score (Jacobo LEIGH, et al.,  2019) is: 3.5%    Values used to calculate the score:      Age: 64 years      Sex: Female      Is Non- : No      Diabetic: No      Tobacco smoker: No      Systolic Blood Pressure: 110 mmHg      Is BP treated: No      HDL Cholesterol: 62 mg/dL      Total Cholesterol: 235 mg/dL       Reviewed and updated as needed this visit by Provider                    Past Medical History:   Diagnosis Date    AC (acromioclavicular) joint arthritis     NO ACTIVE PROBLEMS      Past Surgical History:   Procedure Laterality Date    EYE SURGERY  as child    bilat.     OB History    Para Term  AB Living   2 2 2 0 0 2   SAB IAB Ectopic Multiple Live Births   0 0 0 0 0      # Outcome Date GA Lbr Luis Carlos/2nd Weight Sex Type Anes PTL Lv   2 Term            1 Term              Labs reviewed in EPIC  BP Readings from Last 3 Encounters:   25 110/72   24 114/70   24 118/72    Wt Readings from Last 3 Encounters:   25 68.5 kg (151 lb)   24 69.9 kg (154 lb)   24 70.3 kg (155 lb)                  Patient Active Problem List   Diagnosis    HYPERLIPIDEMIA LDL GOAL <130    Compression fracture of body of thoracic vertebra (H)     Past Surgical History:   Procedure Laterality Date    EYE SURGERY  as child    bilat.       Social History     Tobacco Use    Smoking status: Former     Current packs/day: 0.00     Types: Cigarettes     Quit date: 1987     Years since quittin.5    Smokeless tobacco: Never   Substance Use Topics    Alcohol use: Yes     Comment: 1-2 uses/year     Family History   Problem Relation Age of Onset    Heart Disease Father     Lipids Father     Diabetes Maternal Grandmother     Eye Disorder Maternal Grandmother         cataract    Heart Disease Maternal Grandmother     Arthritis Maternal Grandmother     Hypertension Maternal Grandmother     Hypertension Sister     Lipids Sister     Gynecology Sister         pelvic inflam disease    Allergies Mother      "Respiratory Mother         emphysema    Heart Disease Mother         COPD    Arthritis Paternal Grandmother     Eye Disorder Paternal Grandmother         cataract    Gastrointestinal Disease Paternal Grandmother         diverticulitis    Lipids Paternal Grandfather     Diabetes Paternal Grandfather     Heart Disease Maternal Grandfather     Cardiovascular Maternal Grandfather         MI    No Known Problems Son     Hypertension Sister          No current outpatient medications on file.     Allergies   Allergen Reactions    Clindamycin Hives     Recent Labs   Lab Test 07/19/24  0855 07/19/23  0900 05/02/23 2002 07/29/22  0849 04/13/21  0748 07/05/19  0832   * 128*  --  111* 131* 123*   HDL 62 61  --  61 67 71   TRIG 189* 242*  --  185* 154* 127   ALT  --   --   --  24 25 26   CR  --   --  0.69 0.79 0.78 0.68   GFRESTIMATED  --   --  >90 85 82 >90   GFRESTBLACK  --   --   --   --  >90 >90   POTASSIUM  --   --  3.5 4.1 3.7 3.8   TSH  --  1.90  --   --   --   --              Review of Systems  Negative unless otherwise specified per HPI.       Objective    Exam  /72   Pulse 63   Temp 97.2  F (36.2  C) (Temporal)   Resp 16   Ht 1.672 m (5' 5.83\")   Wt 68.5 kg (151 lb)   LMP 12/04/2016   SpO2 97%   BMI 24.50 kg/m     Estimated body mass index is 24.5 kg/m  as calculated from the following:    Height as of this encounter: 1.672 m (5' 5.83\").    Weight as of this encounter: 68.5 kg (151 lb).    Physical Exam  GENERAL: alert and no distress  EYES: Eyes grossly normal to inspection, PERRL and conjunctivae and sclerae normal  HENT: ear canals and TM's normal, nose and mouth without ulcers or lesions  NECK: no adenopathy, no asymmetry, masses, or scars  RESP: lungs clear to auscultation - no rales, rhonchi or wheezes  CV: regular rate and rhythm, normal S1 S2, no S3 or S4, no murmur, click or rub, no peripheral edema  ABDOMEN: soft, nontender, no hepatosplenomegaly, no masses and bowel sounds normal  MS: " no gross musculoskeletal defects noted, no edema. R hip nttp, FROM. Benign exam.  SKIN: no suspicious lesions or rashes  NEURO: Normal strength and tone, mentation intact and speech normal  PSYCH: mentation appears normal, affect normal/bright        Signed Electronically by: Carla Cardoza DO

## 2025-07-14 NOTE — PATIENT INSTRUCTIONS
Patient Education   Preventive Care Advice   This is general advice given by our system to help you stay healthy. However, your care team may have specific advice just for you. Please talk to your care team about your preventive care needs.  Nutrition  Eat 5 or more servings of fruits and vegetables each day.  Try wheat bread, brown rice and whole grain pasta (instead of white bread, rice, and pasta).  Get enough calcium and vitamin D. Check the label on foods and aim for 100% of the RDA (recommended daily allowance).  Lifestyle  Exercise at least 150 minutes each week  (30 minutes a day, 5 days a week).  Do muscle strengthening activities 2 days a week. These help control your weight and prevent disease.  No smoking.  Wear sunscreen to prevent skin cancer.  Have a dental exam and cleaning every 6 months.  Yearly exams  See your health care team every year to talk about:  Any changes in your health.  Any medicines your care team has prescribed.  Preventive care, family planning, and ways to prevent chronic diseases.  Shots (vaccines)   HPV shots (up to age 26), if you've never had them before.  Hepatitis B shots (up to age 59), if you've never had them before.  COVID-19 shot: Get this shot when it's due.  Flu shot: Get a flu shot every year.  Tetanus shot: Get a tetanus shot every 10 years.  Pneumococcal, hepatitis A, and RSV shots: Ask your care team if you need these based on your risk.  Shingles shot (for age 50 and up)  General health tests  Diabetes screening:  Starting at age 35, Get screened for diabetes at least every 3 years.  If you are younger than age 35, ask your care team if you should be screened for diabetes.  Cholesterol test: At age 39, start having a cholesterol test every 5 years, or more often if advised.  Bone density scan (DEXA): At age 50, ask your care team if you should have this scan for osteoporosis (brittle bones).  Hepatitis C: Get tested at least once in your life.  STIs (sexually  transmitted infections)  Before age 24: Ask your care team if you should be screened for STIs.  After age 24: Get screened for STIs if you're at risk. You are at risk for STIs (including HIV) if:  You are sexually active with more than one person.  You don't use condoms every time.  You or a partner was diagnosed with a sexually transmitted infection.  If you are at risk for HIV, ask about PrEP medicine to prevent HIV.  Get tested for HIV at least once in your life, whether you are at risk for HIV or not.  Cancer screening tests  Cervical cancer screening: If you have a cervix, begin getting regular cervical cancer screening tests starting at age 21.  Breast cancer scan (mammogram): If you've ever had breasts, begin having regular mammograms starting at age 40. This is a scan to check for breast cancer.  Colon cancer screening: It is important to start screening for colon cancer at age 45.  Have a colonoscopy test every 10 years (or more often if you're at risk) Or, ask your provider about stool tests like a FIT test every year or Cologuard test every 3 years.  To learn more about your testing options, visit:   .  For help making a decision, visit:   https://bit.ly/nc00925.  Prostate cancer screening test: If you have a prostate, ask your care team if a prostate cancer screening test (PSA) at age 55 is right for you.  Lung cancer screening: If you are a current or former smoker ages 50 to 80, ask your care team if ongoing lung cancer screenings are right for you.  For informational purposes only. Not to replace the advice of your health care provider. Copyright   2023 [facility]. All rights reserved. Clinically reviewed by the [facility] Transitions Program. [employer] 059648 - REV 01/24.  Learning About Being Physically Active  What is physical activity?     Being physically active means doing any kind of activity that gets your body moving.  The types of physical activity that can help you get  "fit and stay healthy include:  Aerobic or \"cardio\" activities. These make your heart beat faster and make you breathe harder, such as brisk walking, riding a bike, or running. They strengthen your heart and lungs and build up your endurance.  Strength training activities. These make your muscles work against, or \"resist,\" something. Examples include lifting weights or doing push-ups. These activities help tone and strengthen your muscles and bones.  Stretches. These let you move your joints and muscles through their full range of motion. Stretching helps you be more flexible.  Reaching a balance between these three types of physical activity is important because each one contributes to your overall fitness.  What are the benefits of being active?  Being active is one of the best things you can do for your health. It helps you to:  Feel stronger and have more energy to do all the things you like to do.  Focus better at school or work.  Feel, think, and sleep better.  Reach and stay at a healthy weight.  Lose fat and build lean muscle.  Lower your risk for serious health problems, including diabetes, heart attack, high blood pressure, and some cancers.  Keep your heart, lungs, bones, muscles, and joints strong and healthy.  How can you make being active part of your life?  Start slowly. Make it your long-term goal to get at least 30 minutes of exercise on most days of the week. Walking is a good choice. You also may want to do other activities, such as running, swimming, cycling, or playing tennis or team sports.  Pick activities that you like--ones that make your heart beat faster, your muscles stronger, and your muscles and joints more flexible. If you find more than one thing you like doing, do them all. You don't have to do the same thing every day.  Get your heart pumping every day. Any activity that makes your heart beat faster and keeps it at that rate for a while counts.  Here are some great ways to get your " "heart beating faster:  Go for a brisk walk, run, or hike.  Go for a swim or bike ride.  Take an online exercise class or dance.  Play a game of touch football, basketball, or soccer.  Play tennis, pickleball, or racquetball.  Climb stairs.  Even some household chores can be aerobic. Just do them at a faster pace. Raking or mowing the lawn, sweeping the garage, and vacuuming and cleaning your home all can help get your heart rate up.  Strengthen your muscles during the week. You don't have to lift heavy weights or grow big, bulky muscles to get stronger. Doing a few simple activities that make your muscles work against, or \"resist,\" something can help you get stronger. Aim for at least twice a week.  For example, you can:  Do push-ups or sit-ups, which use your own body weight as resistance.  Lift weights or dumbbells or use stretch bands at home or in a gym or community center.  Stretch your muscles often. Stretching will help you as you become more active. It can help you stay flexible and loosen tight muscles. It can also help improve your balance and posture and can be a great way to relax.  Be sure to stretch the muscles you'll be using when you work out. It's best to warm your muscles slightly before you stretch them. Walk or do some other light aerobic activity for a few minutes. Then start stretching.  When you stretch your muscles:  Do it slowly. Stretching is not about going fast or making sudden movements.  Don't push or bounce during a stretch.  Hold each stretch for at least 15 to 30 seconds, if you can. You should feel a stretch in the muscle, but not pain.  Breathe out as you do the stretch. Then breathe in as you hold the stretch. Don't hold your breath.  If you're worried about how more activity might affect your health, have a checkup before you start. Follow any special advice your doctor gives you for getting a smart start.  Where can you learn more?  Go to " "https://www.[employer].net/patiented  Enter W332 in the search box to learn more about \"Learning About Being Physically Active.\"  Current as of: July 31, 2024  Content Version: 14.5 2024-2025 [employer].   Care instructions adapted under license by your healthcare professional. If you have questions about a medical condition or this instruction, always ask your healthcare professional. [employer] disclaims any warranty or liability for your use of this information.    Eating Healthy Foods: Care Instructions  With every meal, you can make healthy food choices. Try to eat a variety of fruits, vegetables, whole grains, lean proteins, and low-fat dairy products. This can help you get the right balance of nutrients, including vitamins and minerals. Small changes add up over time. You can start by adding one healthy food to your meals each day.    Try to make half your plate fruits and vegetables, one-fourth whole grains, and one-fourth lean proteins. Try including dairy with your meals.   Eat more fruits and vegetables. Try to have them with most meals and snacks.   Foods for healthy eating        Fruits   These can be fresh, frozen, canned, or dried.  Try to choose whole fruit rather than fruit juice.  Eat a variety of colors.        Vegetables   These can be fresh, frozen, canned, or dried.  Beans, peas, and lentils count too.        Whole grains   Choose whole-grain breads, cereals, and noodles.  Try brown rice.        Lean proteins   These can include lean meat, poultry, fish, and eggs.  You can also have tofu, beans, peas, lentils, nuts, and seeds.        Dairy   Try milk, yogurt, and cheese.  Choose low-fat or fat-free when you can.  If you need to, use lactose-free milk or fortified plant-based milk products, such as soy milk.        Water   Drink water when you're thirsty.  Limit sugar-sweetened drinks, including soda, fruit drinks, and sports drinks.  Where can you learn more?  Go to " "https://www.[employer].net/patiented  Enter T756 in the search box to learn more about \"Eating Healthy Foods: Care Instructions.\"  Current as of: October 7, 2024  Content Version: 14.5 2024-2025 [employer].   Care instructions adapted under license by your healthcare professional. If you have questions about a medical condition or this instruction, always ask your healthcare professional. [employer] disclaims any warranty or liability for your use of this information.         American Cancer Society screening recommendations for women at average breast cancer risk  These guidelines are for women at average risk for breast cancer. For screening purposes, a woman is considered to be at average risk if she doesn t have a personal history of breast cancer, a strong family history of breast cancer, or a genetic mutation known to increase risk of breast cancer (such as in a BRCA gene), and has not had chest radiation therapy before the age of 30. (See below for guidelines for women at high risk.)    Women between 40 and 44 have the option to start screening with a mammogram every year.   Women 45 to 54 should get mammograms every year.  Women 55 and older can switch to a mammogram every other year, or they can choose to continue yearly mammograms. Screening should continue as long as a woman is in good health and is expected to live at least 10 more years.  Clinical breast exams (physical exams done by a health professional) are not recommended for breast cancer screening among average-risk women at any age.    Mammograms  Mammograms are low-dose x-rays of the breast. Regular mammograms can help find breast cancer at an early stage, when treatment is most likely to be successful. A mammogram can often find breast changes that could be cancer years before physical symptoms develop. Results from many decades of research clearly show that women who have regular mammograms are more likely to have " breast cancer found earlier, are less likely to need aggressive treatments like surgery to remove the entire breast (mastectomy) and chemotherapy, and are more likely to be cured.    Mammograms are not perfect. They miss some breast cancers. And if something is found on a screening mammogram, a woman will likely need other tests (such as more mammograms or a breast ultrasound) to find out if it is cancer. There s also a small chance of being diagnosed with a cancer that never would have caused any problems had it not been found during screening. (This is called overdiagnosis.) It's important that women getting mammograms know what to expect and understand the benefits and limitations of screening.    2D vs. 3D mammograms  In recent years, a newer type of mammogram called digital breast tomosynthesis (commonly known as three-dimensional [3D] mammography) has become much more common, although it s not available in all breast imaging centers.    Many studies have found that 3D mammography appears to lower the chance of being called back after screening for follow-up testing. It also appears to find more breast cancers, and several studies have shown it can be helpful in women with more dense breasts. A large study is now in progress to better compare outcomes between 3D mammograms and standard (2D) mammograms.    It should be noted that 3D mammograms often cost more than 2D mammograms, and this added cost may not be covered by insurance.    The American Cancer Society (ACS) breast cancer screening guidelines consider having had either a 2D or 3D mammogram as being in line with current screening recommendations. The ACS also believes that women should be able to choose between 2D and 3D mammography if they or their doctor believes one would be more appropriate, and that out-of-pocket costs should not be a barrier to having either one.    Clinical breast exam (CBE) and breast self-exam (BSE)  Research has not shown a  clear benefit of regular physical breast exams done by either a health professional (clinical breast exams) or by women themselves (breast self-exams). There is very little evidence that these tests help find breast cancer early when women also get screening mammograms. Most often when breast cancer is detected because of symptoms (such as a lump in the breast), a woman discovers the symptom during usual activities such as bathing or dressing. Women should be familiar with how their breasts normally look and feel and should report any changes to a health care provider right away.    While the American Cancer Society does not recommend regular clinical breast exams or breast self-exams as part of a routine breast cancer screening schedule, this does not mean that these exams will never be done. In some situations, particularly with women at higher-than-average risk, for example, health care providers may still offer clinical breast exams, along with providing counseling about risk and early detection. Some women might still be more comfortable doing regular self-exams as a way to keep track of how their breasts look and feel. But it s important to understand that there is very little evidence that doing these exams routinely is helpful for women at average risk of breast cancer.     More than self-exams it is recommended to practice breast self awareness.    Breast self-awareness is about getting to know how your breasts normally look and feel, so you can quickly notice any changes. It's more than just performing a breast self-exam; it's about developing a sense of your breasts' usual appearance and texture, and reporting any deviations to your doctor.     Familiarity: Breast self-awareness involves becoming familiar with how your breasts look and feel at different times, including different stages of your menstrual cycle and even after procedures like biopsies.   Regular Observation: It's not about a specific  schedule of exams, but rather about regularly observing your breasts, both visually and through touch.   Recognizing Changes: The goal is to be able to quickly identify any changes from your normal baseline, such as lumps, skin changes, or nipple discharge.     Why is breast self-awareness important?   Early Detection: Breast self-awareness can help you find changes early, which can be crucial for effective treatment.   Empowerment: Knowing your breasts empowers you to take charge of your health and seek medical advice when needed.   Reduced Anxiety: When you understand your breasts and how to check them, it can reduce anxiety associated with finding something unusual.     How to develop breast self-awareness:  In the Shower: Use the pads of your fingers to feel for any new lumps, thickenings, or hardened knots while your skin is wet and your breasts are relaxed.   In Front of a Mirror: Visually inspect your breasts for any changes in size, shape, nipple position, or skin texture.   Lying Down: Lying down allows breast tissue to spread out, making it easier to feel for changes.   Choose a Time: Many women find it helpful to check their breasts a few days after their period, when breasts are less likely to be swollen and tender.   What to report to your doctor: Lump or Thickening: Any new lump, hard knot, or thickening inside the breast or underarm area.  Skin Changes: Swelling, warmth, redness, dimpling, or puckering of the skin.  Nipple Changes: Itchy, scaly sore or rash, nipple discharge (especially if sudden or bloody), or pulling in of the nipple.  Pain: New pain in one spot that doesn't go away.   Key Takeaway: Breast self-awareness is about knowing your breasts and being able to identify any changes. If you notice anything unusual, report it to your healthcare provider.     American Cancer Society screening recommendations for women at high risk  Women who are at high risk for breast cancer based on certain  factors should get a breast MRI and a mammogram every year, typically starting at age 30. This includes women who:    Have a lifetime risk of breast cancer of about 20% to 25% or greater, according to risk assessment tools that are based mainly on family history (see below)  Have a known BRCA1 or BRCA2 gene mutation (based on having had genetic testing)  Have a first-degree relative (parent, brother, sister, or child) with a BRCA1 or BRCA2 gene mutation, and have not had genetic testing themselves  Had radiation therapy to the chest before they were 30 years old  Have Li-Fraumeni syndrome, Cowden syndrome, or Uxwbzhsd-Ilmzr-Khqlidtsi syndrome, or have first-degree relatives with one of these syndromes  The American Cancer Society recommends against MRI screening for women whose lifetime risk of breast cancer is less than 15%.    There s not enough evidence to make a recommendation for or against yearly MRI screening for women who have a higher lifetime risk based on certain factors, such as:    Having a personal history of breast cancer, ductal carcinoma in situ (DCIS), lobular carcinoma in situ (LCIS), atypical ductal hyperplasia (ADH), or atypical lobular hyperplasia (ALH)  Having  extremely  or  heterogeneously  dense breasts as seen on a mammogram  If MRI is used, it should be in addition to, not instead of, a screening mammogram. This is because although an MRI is more likely to find cancer than a mammogram, it may still miss some cancers that a mammogram would find.    Most women at high risk should begin screening with MRI and mammograms when they are 30 and continue for as long as they are in good health. But this is a decision that should be made with a woman's health care providers, taking into account her personal circumstances and preferences.